# Patient Record
Sex: FEMALE | Race: OTHER | URBAN - METROPOLITAN AREA
[De-identification: names, ages, dates, MRNs, and addresses within clinical notes are randomized per-mention and may not be internally consistent; named-entity substitution may affect disease eponyms.]

---

## 2017-01-20 ENCOUNTER — EMERGENCY (EMERGENCY)
Facility: HOSPITAL | Age: 32
LOS: 0 days | Discharge: HOME | End: 2017-01-21
Admitting: EMERGENCY MEDICINE
Payer: MEDICAID
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
## 2017-01-20 DIAGNOSIS — T78.1XXA OTHER ADVERSE FOOD REACTIONS, NOT ELSEWHERE CLASSIFIED, INITIAL ENCOUNTER: ICD-10-CM

## 2017-01-20 DIAGNOSIS — R07.9 CHEST PAIN, UNSPECIFIED: ICD-10-CM

## 2017-01-20 DIAGNOSIS — R60.9 EDEMA, UNSPECIFIED: ICD-10-CM

## 2017-01-20 DIAGNOSIS — T78.3XXA ANGIONEUROTIC EDEMA, INITIAL ENCOUNTER: ICD-10-CM
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
## 2017-01-20 DIAGNOSIS — T78.40XA ALLERGY, UNSPECIFIED, INITIAL ENCOUNTER: ICD-10-CM

## 2017-01-20 PROCEDURE — 99284 EMERGENCY DEPT VISIT MOD MDM: CPT

## 2017-01-20 PROCEDURE — 93010 ELECTROCARDIOGRAM REPORT: CPT

## 2017-03-01 ENCOUNTER — OUTPATIENT (OUTPATIENT)
Dept: OUTPATIENT SERVICES | Facility: HOSPITAL | Age: 32
LOS: 1 days | End: 2017-03-01
Payer: MEDICAID

## 2017-03-13 DIAGNOSIS — R69 ILLNESS, UNSPECIFIED: ICD-10-CM

## 2017-09-01 PROCEDURE — G9001: CPT

## 2018-06-18 ENCOUNTER — HOSPITAL ENCOUNTER (EMERGENCY)
Facility: HOSPITAL | Age: 33
Discharge: HOME | End: 2018-06-18
Attending: EMERGENCY MEDICINE
Payer: MEDICAID

## 2018-06-18 VITALS
SYSTOLIC BLOOD PRESSURE: 110 MMHG | OXYGEN SATURATION: 100 % | HEIGHT: 61 IN | RESPIRATION RATE: 18 BRPM | DIASTOLIC BLOOD PRESSURE: 70 MMHG | BODY MASS INDEX: 32.1 KG/M2 | TEMPERATURE: 99 F | HEART RATE: 82 BPM | WEIGHT: 170 LBS

## 2018-06-18 DIAGNOSIS — R19.7 DIARRHEA, UNSPECIFIED TYPE: ICD-10-CM

## 2018-06-18 DIAGNOSIS — K52.9 GASTROENTERITIS: Primary | ICD-10-CM

## 2018-06-18 DIAGNOSIS — R11.0 NAUSEA: ICD-10-CM

## 2018-06-18 LAB
ALBUMIN SERPL BCP-MCNC: 3.8 G/DL
ALP SERPL-CCNC: 63 U/L
ALT SERPL W/O P-5'-P-CCNC: 26 U/L
ANION GAP SERPL CALC-SCNC: 8 MMOL/L
AST SERPL-CCNC: 22 U/L
B-HCG UR QL: NEGATIVE
BACTERIA #/AREA URNS HPF: ABNORMAL /HPF
BASOPHILS # BLD AUTO: 0.01 K/UL
BASOPHILS NFR BLD: 0.1 %
BILIRUB SERPL-MCNC: 0.4 MG/DL
BILIRUB UR QL STRIP: NEGATIVE
BUN SERPL-MCNC: 10 MG/DL
CALCIUM SERPL-MCNC: 9.2 MG/DL
CHLORIDE SERPL-SCNC: 108 MMOL/L
CLARITY UR: CLEAR
CO2 SERPL-SCNC: 22 MMOL/L
COLOR UR: YELLOW
CREAT SERPL-MCNC: 0.8 MG/DL
CTP QC/QA: YES
DIFFERENTIAL METHOD: ABNORMAL
EOSINOPHIL # BLD AUTO: 0.1 K/UL
EOSINOPHIL NFR BLD: 1.7 %
ERYTHROCYTE [DISTWIDTH] IN BLOOD BY AUTOMATED COUNT: 13.4 %
EST. GFR  (AFRICAN AMERICAN): >60 ML/MIN/1.73 M^2
EST. GFR  (NON AFRICAN AMERICAN): >60 ML/MIN/1.73 M^2
GLUCOSE SERPL-MCNC: 84 MG/DL
GLUCOSE UR QL STRIP: NEGATIVE
HCT VFR BLD AUTO: 35.3 %
HGB BLD-MCNC: 12.1 G/DL
HGB UR QL STRIP: ABNORMAL
KETONES UR QL STRIP: NEGATIVE
LEUKOCYTE ESTERASE UR QL STRIP: NEGATIVE
LIPASE SERPL-CCNC: 61 U/L
LYMPHOCYTES # BLD AUTO: 0.8 K/UL
LYMPHOCYTES NFR BLD: 11.3 %
MCH RBC QN AUTO: 28.3 PG
MCHC RBC AUTO-ENTMCNC: 34.3 G/DL
MCV RBC AUTO: 83 FL
MICROSCOPIC COMMENT: ABNORMAL
MONOCYTES # BLD AUTO: 0.4 K/UL
MONOCYTES NFR BLD: 5.6 %
NEUTROPHILS # BLD AUTO: 5.8 K/UL
NEUTROPHILS NFR BLD: 81.2 %
NITRITE UR QL STRIP: NEGATIVE
PH UR STRIP: 5 [PH] (ref 5–8)
PLATELET # BLD AUTO: 312 K/UL
PMV BLD AUTO: 10.4 FL
POTASSIUM SERPL-SCNC: 3.7 MMOL/L
PROT SERPL-MCNC: 7.9 G/DL
PROT UR QL STRIP: NEGATIVE
RBC # BLD AUTO: 4.27 M/UL
RBC #/AREA URNS HPF: >100 /HPF (ref 0–4)
SODIUM SERPL-SCNC: 138 MMOL/L
SP GR UR STRIP: 1.01 (ref 1–1.03)
SQUAMOUS #/AREA URNS HPF: 0 /HPF
URN SPEC COLLECT METH UR: ABNORMAL
UROBILINOGEN UR STRIP-ACNC: NEGATIVE EU/DL
WBC # BLD AUTO: 7.09 K/UL
WBC #/AREA URNS HPF: 0 /HPF (ref 0–5)

## 2018-06-18 PROCEDURE — 83690 ASSAY OF LIPASE: CPT

## 2018-06-18 PROCEDURE — 81025 URINE PREGNANCY TEST: CPT | Performed by: PHYSICIAN ASSISTANT

## 2018-06-18 PROCEDURE — 25000003 PHARM REV CODE 250: Performed by: NURSE PRACTITIONER

## 2018-06-18 PROCEDURE — 85025 COMPLETE CBC W/AUTO DIFF WBC: CPT

## 2018-06-18 PROCEDURE — 80053 COMPREHEN METABOLIC PANEL: CPT

## 2018-06-18 PROCEDURE — 63600175 PHARM REV CODE 636 W HCPCS: Performed by: NURSE PRACTITIONER

## 2018-06-18 PROCEDURE — 81000 URINALYSIS NONAUTO W/SCOPE: CPT

## 2018-06-18 PROCEDURE — 96361 HYDRATE IV INFUSION ADD-ON: CPT

## 2018-06-18 PROCEDURE — 99283 EMERGENCY DEPT VISIT LOW MDM: CPT | Mod: 25

## 2018-06-18 PROCEDURE — 96374 THER/PROPH/DIAG INJ IV PUSH: CPT

## 2018-06-18 RX ORDER — DICYCLOMINE HYDROCHLORIDE 10 MG/1
10 CAPSULE ORAL
Status: COMPLETED | OUTPATIENT
Start: 2018-06-18 | End: 2018-06-18

## 2018-06-18 RX ORDER — DICYCLOMINE HYDROCHLORIDE 20 MG/1
20 TABLET ORAL 2 TIMES DAILY PRN
Qty: 20 TABLET | Refills: 0 | Status: SHIPPED | OUTPATIENT
Start: 2018-06-18 | End: 2018-07-18

## 2018-06-18 RX ORDER — ONDANSETRON 4 MG/1
4 TABLET, FILM COATED ORAL EVERY 6 HOURS PRN
Qty: 12 TABLET | Refills: 0 | Status: SHIPPED | OUTPATIENT
Start: 2018-06-18

## 2018-06-18 RX ORDER — ONDANSETRON 2 MG/ML
4 INJECTION INTRAMUSCULAR; INTRAVENOUS
Status: COMPLETED | OUTPATIENT
Start: 2018-06-18 | End: 2018-06-18

## 2018-06-18 RX ADMIN — SODIUM CHLORIDE 1000 ML: 0.9 INJECTION, SOLUTION INTRAVENOUS at 04:06

## 2018-06-18 RX ADMIN — ONDANSETRON 4 MG: 2 INJECTION INTRAMUSCULAR; INTRAVENOUS at 04:06

## 2018-06-18 RX ADMIN — DICYCLOMINE HYDROCHLORIDE 10 MG: 10 CAPSULE ORAL at 04:06

## 2018-06-18 NOTE — DISCHARGE INSTRUCTIONS
Drink plenty of fluids to stay hydrated. Please follow up with her primary care doctor this week.      Please return to the Emergency Department for any new or worsening symptoms including: worsening abdominal pain, dark\black\bloody bowel movements, vomiting blood, hard abdomen, fever, chest pain, shortness of breath, loss of consciousness or any other concerns.     Please follow up with your Primary Care Provider within in the week. If you do not have a Primary Care Provider, you may contact the one listed on your discharge paperwork or you may also call the [facility] Appointment Desk at [phone] to schedule an appointment with a Primary Care Provider.

## 2018-06-18 NOTE — ED PROVIDER NOTES
Encounter Date: 6/18/2018  32 y.o. female with epigastric pain radiating to the lower abdomen with nausea and diarrhea.  Orders placed.  Patient will be seen by another provider for further evaluation when an exam room is available. Shivam ACEVEDO, 4:03 PM       History     Chief Complaint   Patient presents with    Abdominal Pain     States she has abd pain and diarrhea since yesterday.  Hx of gallbladder removal and ovarian cysts    Diarrhea     CC:  Abdominal pain, diarrhea    HPI:  This is evaluation of a 32-year-old female presenting with intermittent abdominal pain and diarrhea for 2 days.  Her abdominal pain is intermittent, beginning in the epigastric region and radiating down to her lower abdomen.  She reports 3 episodes of nonbloody diarrhea today.  She is nauseated but has not vomited.  She reports subjective fever and chills. She is currently on her menstrual cycle and reports painful menstruation.  No known sick contacts.  She has not attempted any treatment prior to arrival.    History of cholecystectomy in 2006 as well as left ovary removed in 2005 due to cyst.      The history is provided by the patient. No  was used.     Review of patient's allergies indicates:  Allergies not on file  Past Medical History:   Diagnosis Date    Ovarian cyst      Past Surgical History:   Procedure Laterality Date    CHOLECYSTECTOMY      OVARIAN CYST SURGERY       History reviewed. No pertinent family history.  Social History   Substance Use Topics    Smoking status: Never Smoker    Smokeless tobacco: Never Used    Alcohol use No     Review of Systems   Constitutional: Positive for chills and fever.   HENT: Negative for sore throat.    Respiratory: Negative for shortness of breath.    Cardiovascular: Negative for chest pain.   Gastrointestinal: Positive for abdominal pain, diarrhea and nausea. Negative for blood in stool and vomiting.   Genitourinary: Positive for vaginal bleeding (Currently  menstruating). Negative for difficulty urinating, dysuria and frequency.   Musculoskeletal: Negative for back pain.   Skin: Negative for rash.   Neurological: Negative for weakness.   Hematological: Does not bruise/bleed easily.       Physical Exam     Initial Vitals [06/18/18 1602]   BP Pulse Resp Temp SpO2   (!) 135/93 (!) 120 16 99.7 °F (37.6 °C) 98 %      MAP       --         Physical Exam    Constitutional: She appears well-developed and well-nourished. She is not diaphoretic. No distress.   HENT:   Head: Normocephalic and atraumatic.   Neck: Normal range of motion.   Cardiovascular: Normal rate, regular rhythm and normal heart sounds. Exam reveals no gallop and no friction rub.    No murmur heard.  Pulmonary/Chest: Breath sounds normal. No respiratory distress.   Abdominal: Soft. There is no hepatosplenomegaly. There is tenderness in the epigastric area. There is no rigidity, no rebound, no guarding, no CVA tenderness, no tenderness at McBurney's point and negative Harris's sign.   Musculoskeletal: Normal range of motion.   Neurological: She is alert and oriented to person, place, and time.   Skin: Skin is warm and dry.   Psychiatric: She has a normal mood and affect. Her behavior is normal.         ED Course   Procedures  Labs Reviewed   CBC W/ AUTO DIFFERENTIAL - Abnormal; Notable for the following:        Result Value    Hematocrit 35.3 (*)     Lymph # 0.8 (*)     Gran% 81.2 (*)     Lymph% 11.3 (*)     All other components within normal limits   COMPREHENSIVE METABOLIC PANEL - Abnormal; Notable for the following:     CO2 22 (*)     All other components within normal limits   LIPASE - Abnormal; Notable for the following:     Lipase 61 (*)     All other components within normal limits   URINALYSIS - Abnormal; Notable for the following:     Occult Blood UA 3+ (*)     All other components within normal limits   URINALYSIS MICROSCOPIC - Abnormal; Notable for the following:     RBC, UA >100 (*)     All other  components within normal limits   POCT URINE PREGNANCY          No orders to display        Medical Decision Making:   ED Management:  This is an evaluation of a 32 y.o. female that presents to the Emergency Department for Abdominal Pain. Physical Exam shows a non-toxic, afebrile, and well appearing female. On examination the abdomen is flat without distention.  There is no surface trauma, scars, incisions.  Bowel sounds are present in all 4 quadrants.  No guarding or rigidity to palpation.  There is mild tenderness with palpation of the epigastric region. No masses or pulsation in epigastric area.  No organomegaly.  Negative Harris sign. No rebound in the lower quadrants.  Nontender over McBurney's point.  No suprapubic tenderness or distention.  Good femoral pulses bilaterally.  No CVA tenderness.    Vital Signs Are Reassuring.  RESULTS:   UPT negative.  Urinalysis with 3+ occult blood.  Microscopic UA with greater than 100 red blood cells.  Patient is currently menstruating.  The chemistry was negative for hypo-or hyper natremia, kalemia, chloridemia, or other electrolyte abnormalities; BUN and creatinine were within normal limits indicating normal kidney function, ALT and AST were within normal limits indicating normal liver function, there was no transaminitis.  Lipase within normal limits indicating normal pancreatic function.  CBC was negativefor leukocytosis  indicating unlikely systemic infection, the H&H was stable and was within normal limits and indicating absence of anemia. The platelet count was normal indicating the absence of a tendency toward bleeding.    Patient was given IV fluids, Zofran, and Bentyl with good relief in symptoms. No episodes of emesis in the ED.  She reports improvement in abdominal pain. Tachycardia improved after IV fluid administration.  Abdomen is soft.  No peritoneal signs to suggest acute intra-abdominal process.  Patient had a cholecystectomy in 2006 ruling out gallbladder  involvement.     My overall impression is gastroenteritis. I considered, but at this time, do not suspect an acute intra-abdominal process appendicitis, mesenteric ischemia, obstruction, cholecystitis, pancreatitis, diverticulitis, colitis, volvulus, hernia, UTI.    ED Course: IVFs, zofran, bentyl. D/C Meds: zofran, bentyl. Additional D/C Information: Abdominal Pain Precautions. The diagnosis, treatment plan, instructions for follow-up and reevaluation with her PCP as well as ED return precautions were discussed and understanding was verbalized. All questions or concerns have been addressed.     This case was discussed with Dr. Velasquez who is in agreement with my assessment and plan.                       Clinical Impression:   The primary encounter diagnosis was Gastroenteritis. Diagnoses of Nausea and Diarrhea, unspecified type were also pertinent to this visit.      Disposition:   Disposition: Discharged  Condition: Stable                        Michell Triana NP  06/18/18 3582

## 2018-06-18 NOTE — ED TRIAGE NOTES
Diarrhea since yesterday and epigastric and lower abdominal pain sharp intermittent pain nauseated

## 2018-12-27 ENCOUNTER — HOSPITAL ENCOUNTER (EMERGENCY)
Facility: HOSPITAL | Age: 33
Discharge: HOME | End: 2018-12-27
Attending: EMERGENCY MEDICINE
Payer: MEDICAID

## 2018-12-27 VITALS
TEMPERATURE: 98 F | WEIGHT: 160 LBS | HEIGHT: 61 IN | OXYGEN SATURATION: 95 % | BODY MASS INDEX: 30.21 KG/M2 | HEART RATE: 90 BPM | SYSTOLIC BLOOD PRESSURE: 125 MMHG | RESPIRATION RATE: 18 BRPM | DIASTOLIC BLOOD PRESSURE: 81 MMHG

## 2018-12-27 DIAGNOSIS — M79.609 POPLITEAL PAIN: ICD-10-CM

## 2018-12-27 DIAGNOSIS — M54.2 NECK PAIN: Primary | ICD-10-CM

## 2018-12-27 LAB
B-HCG UR QL: NEGATIVE
CTP QC/QA: YES
DEPRECATED S PYO AG THROAT QL EIA: NEGATIVE

## 2018-12-27 PROCEDURE — 87081 CULTURE SCREEN ONLY: CPT

## 2018-12-27 PROCEDURE — 96372 THER/PROPH/DIAG INJ SC/IM: CPT

## 2018-12-27 PROCEDURE — 81025 URINE PREGNANCY TEST: CPT | Performed by: PHYSICIAN ASSISTANT

## 2018-12-27 PROCEDURE — 87880 STREP A ASSAY W/OPTIC: CPT

## 2018-12-27 PROCEDURE — 25000003 PHARM REV CODE 250: Performed by: PHYSICIAN ASSISTANT

## 2018-12-27 PROCEDURE — 99284 EMERGENCY DEPT VISIT MOD MDM: CPT

## 2018-12-27 RX ORDER — IBUPROFEN 600 MG/1
600 TABLET ORAL EVERY 6 HOURS PRN
Qty: 20 TABLET | Refills: 0 | Status: SHIPPED | OUTPATIENT
Start: 2018-12-27 | End: 2019-01-01

## 2018-12-27 RX ORDER — ACETAMINOPHEN 325 MG/1
650 TABLET ORAL
Status: COMPLETED | OUTPATIENT
Start: 2018-12-27 | End: 2018-12-27

## 2018-12-27 RX ORDER — KETOROLAC TROMETHAMINE 30 MG/ML
15 INJECTION, SOLUTION INTRAMUSCULAR; INTRAVENOUS
Status: DISCONTINUED | OUTPATIENT
Start: 2018-12-27 | End: 2018-12-27 | Stop reason: HOSPADM

## 2018-12-27 RX ADMIN — ACETAMINOPHEN 650 MG: 325 TABLET ORAL at 01:12

## 2018-12-27 NOTE — ED PROVIDER NOTES
Encounter Date: 12/27/2018    SORT:  33 y.o. female presenting to ED with multiple complaints. Limited triage exam:  Non-toxic. If orders were placed, they are pending.     Ricky Levin PA-C  12/27/2018  1:27 PM   SCRIBE #1 NOTE: ISerena, mohsen scribing for, and in the presence of,  Jacinto Allan PA-C. I have scribed the following portions of the note - Other sections scribed: HPI and ROS.       History     Chief Complaint   Patient presents with    Neck Pain     States she has pain in her neck by her glands, but states it doesn't feel like sore throat.  Also states she has lower right leg pain behind her calf muscle    Leg Pain     CC: Neck Pain; Leg Pain    HPI: This 33 y.o female presents to the ED for an evaluation of acute, constant, 8/10 atraumatic left sided neck pain for the past 2 days. Patient also reports of right calf pain.  Patient reports her pain is worse with movement.  Patient denies fever, chills, nausea, emesis, diarrhea, abdominal pain, leg swelling, back pain, extremity numbness, extremity weakness, sore throat, trouble swallowing, or any other associated symptoms.  Patient denies any prior h/o DVT/PE.  Patient denies any recent extended travels, periods of immobilizations, or hormone replacement therapy.  No prior tx.  No alleviating factors.        The history is provided by the patient. No  was used.     Review of patient's allergies indicates:   Allergen Reactions    Penicillins Rash     Past Medical History:   Diagnosis Date    Ovarian cyst      Past Surgical History:   Procedure Laterality Date    CHOLECYSTECTOMY      OVARIAN CYST SURGERY       History reviewed. No pertinent family history.  Social History     Tobacco Use    Smoking status: Never Smoker    Smokeless tobacco: Never Used   Substance Use Topics    Alcohol use: No    Drug use: No     Review of Systems   Constitutional: Negative for chills and fever.   HENT: Negative for ear pain and sore  throat.    Eyes: Negative for pain.   Respiratory: Negative for cough and shortness of breath.    Cardiovascular: Negative for chest pain.   Gastrointestinal: Negative for abdominal pain, diarrhea, nausea and vomiting.   Genitourinary: Negative for dysuria.   Musculoskeletal: Positive for arthralgias and neck pain. Negative for back pain.   Skin: Negative for rash.   Neurological: Negative for weakness, numbness and headaches.       Physical Exam     Initial Vitals [12/27/18 1326]   BP Pulse Resp Temp SpO2   120/74 92 16 99.1 °F (37.3 °C) 97 %      MAP       --         Physical Exam    Nursing note and vitals reviewed.  Constitutional: She appears well-developed and well-nourished. No distress.   HENT:   Head: Normocephalic and atraumatic.   Mouth/Throat: Uvula is midline.   2+ tonsils bilaterally without exudates   Eyes: EOM are normal. Pupils are equal, round, and reactive to light.   Neck: Trachea normal, normal range of motion, full passive range of motion without pain and phonation normal. Neck supple. Thyromegaly ( mild) present. Muscular tenderness (There is tenderness to palpation over the left sternocleidomastoid muscle) present. No tracheal tenderness and no spinous process tenderness present. No tracheal deviation and normal range of motion present. No neck rigidity.   Cardiovascular: Normal rate, regular rhythm and normal heart sounds. Exam reveals no gallop and no friction rub.    No murmur heard.  Pulmonary/Chest: Breath sounds normal. No respiratory distress. She has no wheezes. She has no rhonchi. She has no rales.   Abdominal: Soft. Bowel sounds are normal. There is no tenderness. There is no rebound and no guarding.   Musculoskeletal: Normal range of motion.        Right knee: Normal.        Legs:  No calf swelling   Lymphadenopathy:     She has no cervical adenopathy.   Neurological: She is alert and oriented to person, place, and time.   Skin: Skin is warm and dry.   Psychiatric: She has a normal  mood and affect.         ED Course   Procedures  Labs Reviewed   THROAT SCREEN, RAPID   CULTURE, STREP A,  THROAT   POCT URINE PREGNANCY          Imaging Results          US Lower Extremity Veins Right (Final result)  Result time 12/27/18 14:33:09    Final result by Geovanny Avina MD (12/27/18 14:33:09)                 Impression:      No evidence of deep venous thrombosis in the right lower extremity.      Electronically signed by: Geovanny Avina MD  Date:    12/27/2018  Time:    14:33             Narrative:    EXAMINATION:  US LOWER EXTREMITY VEINS RIGHT    CLINICAL HISTORY:  Pain in unspecified limb    TECHNIQUE:  Duplex and color flow Doppler evaluation and graded compression of the right lower extremity veins was performed.    COMPARISON:  None    FINDINGS:  Duplex and color flow Doppler evaluation does not reveal any evidence of acute venous thrombosis in the common femoral, superficial femoral, greater saphenous, popliteal, peroneal, anterior tibial and posterior tibial veins of the right lower extremity.  There is no reflux to suggest valvular incompetence.                                 Medical Decision Making:   Clinical Tests:   Lab Tests: Ordered and Reviewed  Radiological Study: Ordered and Reviewed  ED Management:  This is an evaluation of a 33 y.o. female who presents to the ED for left-sided neck pain and right calf pain.  Vital signs are stable.   Afebrile.  Patient is nontoxic appearing and in no acute distress. There is tenderness to palpation over the left sternocleidomastoid muscle.  Patient has 2+ tonsils bilaterally without exudates. No cervical lymphadenopathy.  Airway is patent and uvula is midline. I do not suspect infectious etiology.  There is tenderness to palpation to the right proximal lateral aspect of the right calf.  No erythema swelling. UPT negative.  Rapid strep negative. Ultrasound lower extremity shows no signs of DVT.  Etiology of patient's neck pain likely muscle  strain.  Etiology of patient's calf pain likely muscle strain versus ligamentous strain versus tendinitis.  Patient will be discharged home with anti-inflammatories.  Patient urged to follow up with primary care for enlarged tonsils and mild thyromegaly.    Patient given return precautions and instructed to return to the emergency department for any new or worsening symptoms. Patient verbalized understanding and agreed with plan.     I discussed the case with Dr. Jacob who is in agreement with my assessment and plan.              Scribe Attestation:   Scribe #1: I performed the above scribed service and the documentation accurately describes the services I performed. I attest to the accuracy of the note.    Attending Attestation:     Physician Attestation Statement for NP/PA:   I discussed this assessment and plan of this patient with the NP/PA, but I did not personally examine the patient. The face to face encounter was performed by the NP/PA.        Physician Attestation for Scribe:  Physician Attestation Statement for Scribe #1: I, Jacinto Allan PA-C, reviewed documentation, as scribed by Serena Aguilar in my presence, and it is both accurate and complete.                    Clinical Impression:   The primary encounter diagnosis was Neck pain. A diagnosis of Popliteal pain was also pertinent to this visit.                             Jacinto Allan PA-C  12/27/18 2102       Jarad Jacob MD  12/27/18 2120

## 2018-12-27 NOTE — ED TRIAGE NOTES
"" I don't know what it is but it hurts in one spot and on the left of my neck it hurts, it's was really sore yesterday" c/o pain back of R knee since yesterday, rates 8/10, c/o L sided tonsil pain 8/10  "

## 2018-12-29 LAB — BACTERIA THROAT CULT: NORMAL

## 2020-02-03 ENCOUNTER — EMERGENCY (EMERGENCY)
Facility: HOSPITAL | Age: 35
LOS: 0 days | Discharge: HOME | End: 2020-02-03
Attending: EMERGENCY MEDICINE
Payer: MEDICAID

## 2020-02-03 VITALS — WEIGHT: 182.98 LBS | HEIGHT: 61 IN

## 2020-02-03 VITALS
DIASTOLIC BLOOD PRESSURE: 86 MMHG | OXYGEN SATURATION: 100 % | RESPIRATION RATE: 16 BRPM | HEART RATE: 102 BPM | TEMPERATURE: 100 F | SYSTOLIC BLOOD PRESSURE: 129 MMHG

## 2020-02-03 DIAGNOSIS — J06.9 ACUTE UPPER RESPIRATORY INFECTION, UNSPECIFIED: ICD-10-CM

## 2020-02-03 DIAGNOSIS — R09.81 NASAL CONGESTION: ICD-10-CM

## 2020-02-03 DIAGNOSIS — Z88.0 ALLERGY STATUS TO PENICILLIN: ICD-10-CM

## 2020-02-03 DIAGNOSIS — R51 HEADACHE: ICD-10-CM

## 2020-02-03 PROCEDURE — 99283 EMERGENCY DEPT VISIT LOW MDM: CPT

## 2020-02-03 RX ORDER — ACETAMINOPHEN 500 MG
650 TABLET ORAL ONCE
Refills: 0 | Status: COMPLETED | OUTPATIENT
Start: 2020-02-03 | End: 2020-02-03

## 2020-02-03 RX ADMIN — Medication 650 MILLIGRAM(S): at 16:25

## 2020-02-03 NOTE — ED STATDOCS - PATIENT PORTAL LINK FT
You can access the FollowMyHealth Patient Portal offered by [facility] by registering at the following website: [email or web address]. By joining [facility]’s FollowMyHealth portal, you will also be able to view your health information using other applications (apps) compatible with our system.

## 2020-02-03 NOTE — ED STATDOCS - ENMT, MLM
Nasal mucosa clear.  Mouth with normal mucosa  Throat has no vesicles, no oropharyngeal exudates and uvula is midline. +L sided sinus TTP.

## 2020-02-03 NOTE — ED STATDOCS - OBJECTIVE STATEMENT
35 y/o F with no PMHx presents to the ED c/o flu like symptoms including a HA, chest heaviness, sinus congestion and body aches for the past few days. States she was unable to get an appt with her MD prompting ED visit. NKDA. No tobacco use, no EtOH use. Pt has not taken OTC medications.

## 2020-02-03 NOTE — ED STATDOCS - NSFOLLOWUPINSTRUCTIONS_ED_ALL_ED_FT
Upper Respiratory Infection, Adult  An upper respiratory infection (URI) affects the nose, throat, and upper air passages. URIs are caused by germs (viruses). The most common type of URI is often called "the common cold."    Medicines cannot cure URIs, but you can do things at home to relieve your symptoms. URIs usually get better within 7–10 days.    Follow these instructions at home:  Activity     Rest as needed.  If you have a fever, stay home from work or school until your fever is gone, or until your doctor says you may return to work or school.    You should stay home until you cannot spread the infection anymore (you are not contagious).  Your doctor may have you wear a face mask so you have less risk of spreading the infection.    Relieving symptoms     Gargle with a salt-water mixture 3–4 times a day or as needed. To make a salt-water mixture, completely dissolve ½–1 tsp of salt in 1 cup of warm water.  Use a cool-mist humidifier to add moisture to the air. This can help you breathe more easily.  Eating and drinking     Drink enough fluid to keep your pee (urine) pale yellow.  ImageEat soups and other clear broths.  General instructions     Take over-the-counter and prescription medicines only as told by your doctor. These include cold medicines, fever reducers, and cough suppressants.  Do not use any products that contain nicotine or tobacco. These include cigarettes and e-cigarettes. If you need help quitting, ask your doctor.  Avoid being where people are smoking (avoid secondhand smoke).  Make sure you get regular shots and get the flu shot every year.  ImageKeep all follow-up visits as told by your doctor. This is important.  How to avoid spreading infection to others     Wash your hands often with soap and water. If you do not have soap and water, use hand .  Avoid touching your mouth, face, eyes, or nose.  ImageCough or sneeze into a tissue or your sleeve or elbow. Do not cough or sneeze into your hand or into the air.  Contact a doctor if:  You are getting worse, not better.  You have any of these:    A fever.  Chills.  Brown or red mucus in your nose.  Yellow or brown fluid (discharge)coming from your nose.  Pain in your face, especially when you bend forward.  Swollen neck glands.  Pain with swallowing.  White areas in the back of your throat.    Get help right away if:  You have shortness of breath that gets worse.  You have very bad or constant:    Headache.  Ear pain.  Pain in your forehead, behind your eyes, and over your cheekbones (sinus pain).  Chest pain.    You have long-lasting (chronic) lung disease along with any of these:    Wheezing.  Long-lasting cough.  Coughing up blood.  A change in your usual mucus.    You have a stiff neck.  You have changes in your:    Vision.  Hearing.  Thinking.  Mood.    Summary  An upper respiratory infection (URI) is caused by a germ called a virus. The most common type of URI is often called "the common cold."  URIs usually get better within 7–10 days.  Take over-the-counter and prescription medicines only as told by your doctor.  This information is not intended to replace advice given to you by your health care provider. Make sure you discuss any questions you have with your health care provider.     FOLLOW UP WITH AN ENT (EAR NOSE AND THROAT DOCTOR) IN 3-5 DAYS IF STILL FEELING VERY CONGESTED. TAKE OVER THE COUNTER FLONASE AND SUDAFED AS DIRECTED OF THE BOX FOR YOUR SYMPTOMS. RETURN TO ER FOR ANY WORSENING SYMPTOMS OR NEW CONCERNS.

## 2020-02-03 NOTE — ED STATDOCS - PROGRESS NOTE DETAILS
signed Ivelisse Hooper PA-C Pt seen initially in intake by Dr. Angela   34F c/o congestion, body, aches, chest heaviness, leg and arm pain, and left sided HA x 2 days. Pt alert, NAD, sounds slightly congested. Likely viral uri, recommend OTC flonase and sudafed, f/u ENT if congestion persists. Pt feeling well at DC, agrees with DC and plan of care. SAHIL Perez.

## 2020-02-03 NOTE — ED STATDOCS - INTERNATIONAL TRAVEL
[FreeTextEntry1] : 65y/o F with extensive cancer hx presenting for follow-up. \par \par #Stage IV endometrial cancer with reoccurance of disease\par - known brain mets s/p RT\par - currently on carbo/taxol\par - Heme/onc f/u \par \par #Anemia, macrocytic likely multifactorial \par - Hbg 8.8 > 9.6\par - folate 12\par - likely due to chemo and chronic disease and possible underlying bone involvement\par - f/u with heme/onc\par \par #DM\par - last A1c 7.2\par - c/w metformin 500 qhs\par - recheck A1c next time she gets blood done\par - Optho UTD\par \par #Hx of thyroid cancer / Hypothyroidism: \par - please follow up with Dr. Acevedo (referral given today)\par \par #L Cataract\par - pending for surgery\par \par #HCM\par - Mammogram - still pending\par - PAP - Feb 2019 at [place] (being followed with them), s/p hysterectomy \par - colo UTD
No

## 2020-02-06 ENCOUNTER — EMERGENCY (EMERGENCY)
Facility: HOSPITAL | Age: 35
LOS: 0 days | Discharge: HOME | End: 2020-02-06
Attending: EMERGENCY MEDICINE
Payer: MEDICAID

## 2020-02-06 VITALS
HEART RATE: 100 BPM | TEMPERATURE: 99 F | OXYGEN SATURATION: 96 % | SYSTOLIC BLOOD PRESSURE: 137 MMHG | DIASTOLIC BLOOD PRESSURE: 85 MMHG | RESPIRATION RATE: 17 BRPM

## 2020-02-06 VITALS — WEIGHT: 179.9 LBS | HEIGHT: 61 IN

## 2020-02-06 DIAGNOSIS — J10.1 INFLUENZA DUE TO OTHER IDENTIFIED INFLUENZA VIRUS WITH OTHER RESPIRATORY MANIFESTATIONS: ICD-10-CM

## 2020-02-06 DIAGNOSIS — R09.89 OTHER SPECIFIED SYMPTOMS AND SIGNS INVOLVING THE CIRCULATORY AND RESPIRATORY SYSTEMS: ICD-10-CM

## 2020-02-06 DIAGNOSIS — R07.0 PAIN IN THROAT: ICD-10-CM

## 2020-02-06 DIAGNOSIS — Z88.0 ALLERGY STATUS TO PENICILLIN: ICD-10-CM

## 2020-02-06 DIAGNOSIS — R05 COUGH: ICD-10-CM

## 2020-02-06 PROBLEM — Z78.9 OTHER SPECIFIED HEALTH STATUS: Chronic | Status: ACTIVE | Noted: 2020-02-04

## 2020-02-06 LAB
FLU A RESULT: SIGNIFICANT CHANGE UP
FLU A RESULT: SIGNIFICANT CHANGE UP
FLUAV AG NPH QL: SIGNIFICANT CHANGE UP
FLUBV AG NPH QL: DETECTED
RSV RESULT: SIGNIFICANT CHANGE UP
RSV RNA RESP QL NAA+PROBE: SIGNIFICANT CHANGE UP
S PYO AG SPEC QL IA: NEGATIVE — SIGNIFICANT CHANGE UP

## 2020-02-06 PROCEDURE — 99283 EMERGENCY DEPT VISIT LOW MDM: CPT | Mod: 25

## 2020-02-06 PROCEDURE — 71046 X-RAY EXAM CHEST 2 VIEWS: CPT

## 2020-02-06 PROCEDURE — 87631 RESP VIRUS 3-5 TARGETS: CPT

## 2020-02-06 PROCEDURE — 87081 CULTURE SCREEN ONLY: CPT

## 2020-02-06 PROCEDURE — 87880 STREP A ASSAY W/OPTIC: CPT

## 2020-02-06 PROCEDURE — 71046 X-RAY EXAM CHEST 2 VIEWS: CPT | Mod: 26

## 2020-02-06 PROCEDURE — 99284 EMERGENCY DEPT VISIT MOD MDM: CPT

## 2020-02-06 RX ORDER — DEXAMETHASONE 0.5 MG/5ML
10 ELIXIR ORAL ONCE
Refills: 0 | Status: COMPLETED | OUTPATIENT
Start: 2020-02-06 | End: 2020-02-06

## 2020-02-06 RX ADMIN — Medication 10 MILLIGRAM(S): at 11:28

## 2020-02-06 RX ADMIN — Medication 75 MILLIGRAM(S): at 12:35

## 2020-02-06 NOTE — ED STATDOCS - ENMT, MLM
Nasal mucosa clear.  Mouth with normal mucosa  Throat has no vesicles, no oropharyngeal exudates and uvula is midline. Redness of oropharynx. Enlarged tonsils not touching. Mild cervical lymphadenopathy. TM normal b/l.

## 2020-02-06 NOTE — ED STATDOCS - PROGRESS NOTE DETAILS
35 yo unemployed female with no significant PMH presents with worsening flu like symptoms. Pt was seen on 2/3 and dx with a viral URI and advised to take flonase and sudafed. Pt states the congestion helped a little but has a sore throat, prod cough of yellow sputum. Denies fever, sick contacts, cp, sob. Strep test, flu test, cxr, meds, Reeval. -Alonso Godinez PA-C +flu B. Pt was made aware. Pt in the window for treatment. Will treat with tamiflu and advised for pmd fu/ Pt aware and agrees with plan. -Alonso Godienz PA-C

## 2020-02-06 NOTE — ED STATDOCS - OBJECTIVE STATEMENT
33 y/o female with no significant PMHx presents to the ED for evaluation.  Pt reports she has had congestion and a productive cough x3 days. Pt was seen in ED 3 days ago for symptoms and notes symptoms have worsened. Pt states she took Sudafed without improvement. +cough, +congestion, +throat pain. Did not get flu vaccine this year. Nonsmoker. No recent travel. No other complaints at this time.

## 2020-02-06 NOTE — ED ADULT TRIAGE NOTE - CHIEF COMPLAINT QUOTE
pt presents to ED was seen by ED 2-3days ago now with complaints of swollen tonsils has been taking sudafed x 3 days with no relief fever and chills

## 2020-02-06 NOTE — ED ADULT NURSE NOTE - OBJECTIVE STATEMENT
Pt came to the ED for eval of sore throat and tonsil pain for the past week. States she was in the ED 2 days ago for similar sx, was sent home with medications but states symptoms still persist.

## 2020-02-06 NOTE — ED STATDOCS - NSFOLLOWUPINSTRUCTIONS_ED_ALL_ED_FT
Viral Respiratory Infection  A viral respiratory infection is an illness that affects parts of the body used for breathing, like the lungs, nose, and throat. It is caused by a germ called a virus.    ImageSome examples of this kind of infection are:    A cold.  The flu (influenza).  A respiratory syncytial virus (RSV) infection.    How do I know if I have this infection?  Most of the time this infection causes:    A stuffy or runny nose.  Yellow or green fluid in the nose.  A cough.  Sneezing.  Tiredness (fatigue).  Achy muscles.  A sore throat.  Sweating or chills.  A fever.  A headache.    How is this infection treated?  If the flu is diagnosed early, it may be treated with an antiviral medicine. This medicine shortens the length of time a person has symptoms. Symptoms may be treated with over-the-counter and prescription medicines, such as:    Expectorants. These make it easier to cough up mucus.  Decongestant nasal sprays.    Doctors do not prescribe antibiotic medicines for viral infections. They do not work with this kind of infection.    How do I know if I should stay home?  To keep others from getting sick, stay home if you have:    A fever.  A lasting cough.  A sore throat.  A runny nose.  Sneezing.  Muscles aches.  Headaches.  Tiredness.  Weakness.  Chills.  Sweating.  An upset stomach (nausea).    Follow these instructions at home:  Rest as much as possible.  Take over-the-counter and prescription medicines only as told by your doctor.  Drink enough fluid to keep your pee (urine) clear or pale yellow.  Gargle with salt water. Do this 3–4 times per day or as needed. To make a salt–water mixture, dissolve ½–1 tsp of salt in 1 cup of warm water. Make sure the salt dissolves all the way.  Use nose drops made from salt water. This helps with stuffiness (congestion). It also helps soften the skin around your nose.  Do not drink alcohol.  Do not use tobacco products, including cigarettes, chewing tobacco, and e-cigarettes. If you need help quitting, ask your doctor.  Get help if:  Your symptoms last for 10 days or longer.  Your symptoms get worse over time.  You have a fever.  You have very bad pain in your face or forehead.  Parts of your jaw or neck become very swollen.  Get help right away if:  You feel pain or pressure in your chest.  You have shortness of breath.  You faint or feel like you will faint.  You keep throwing up (vomiting).  You feel confused.  This information is not intended to replace advice given to you by your health care provider. Make sure you discuss any questions you have with your health care provider.

## 2020-02-06 NOTE — ED STATDOCS - PATIENT PORTAL LINK FT
You can access the FollowMyHealth Patient Portal offered by [facility] by registering at the following website: [email or web address]. By joining [employer]’s FollowMyHealth portal, you will also be able to view your health information using other applications (apps) compatible with our system.

## 2020-07-10 ENCOUNTER — EMERGENCY (EMERGENCY)
Facility: HOSPITAL | Age: 35
LOS: 0 days | Discharge: HOME | End: 2020-07-10
Attending: STUDENT IN AN ORGANIZED HEALTH CARE EDUCATION/TRAINING PROGRAM
Payer: MEDICAID

## 2020-07-10 VITALS
SYSTOLIC BLOOD PRESSURE: 132 MMHG | OXYGEN SATURATION: 100 % | HEIGHT: 61 IN | DIASTOLIC BLOOD PRESSURE: 91 MMHG | TEMPERATURE: 99 F | HEART RATE: 93 BPM | WEIGHT: 179.9 LBS

## 2020-07-10 VITALS
TEMPERATURE: 99 F | DIASTOLIC BLOOD PRESSURE: 88 MMHG | OXYGEN SATURATION: 100 % | HEART RATE: 90 BPM | SYSTOLIC BLOOD PRESSURE: 130 MMHG

## 2020-07-10 DIAGNOSIS — R51 HEADACHE: ICD-10-CM

## 2020-07-10 DIAGNOSIS — R10.31 RIGHT LOWER QUADRANT PAIN: ICD-10-CM

## 2020-07-10 DIAGNOSIS — R11.0 NAUSEA: ICD-10-CM

## 2020-07-10 DIAGNOSIS — Z88.0 ALLERGY STATUS TO PENICILLIN: ICD-10-CM

## 2020-07-10 DIAGNOSIS — Z91.018 ALLERGY TO OTHER FOODS: ICD-10-CM

## 2020-07-10 LAB
ALBUMIN SERPL ELPH-MCNC: 3.8 G/DL — SIGNIFICANT CHANGE UP (ref 3.3–5)
ALP SERPL-CCNC: 64 U/L — SIGNIFICANT CHANGE UP (ref 40–120)
ALT FLD-CCNC: 33 U/L — SIGNIFICANT CHANGE UP (ref 12–78)
ANION GAP SERPL CALC-SCNC: 6 MMOL/L — SIGNIFICANT CHANGE UP (ref 5–17)
APPEARANCE UR: CLEAR — SIGNIFICANT CHANGE UP
AST SERPL-CCNC: 17 U/L — SIGNIFICANT CHANGE UP (ref 15–37)
BASOPHILS # BLD AUTO: 0.03 K/UL — SIGNIFICANT CHANGE UP (ref 0–0.2)
BASOPHILS NFR BLD AUTO: 0.3 % — SIGNIFICANT CHANGE UP (ref 0–2)
BILIRUB SERPL-MCNC: 0.3 MG/DL — SIGNIFICANT CHANGE UP (ref 0.2–1.2)
BILIRUB UR-MCNC: NEGATIVE — SIGNIFICANT CHANGE UP
BUN SERPL-MCNC: 10 MG/DL — SIGNIFICANT CHANGE UP (ref 7–23)
CALCIUM SERPL-MCNC: 9.2 MG/DL — SIGNIFICANT CHANGE UP (ref 8.5–10.1)
CHLORIDE SERPL-SCNC: 107 MMOL/L — SIGNIFICANT CHANGE UP (ref 96–108)
CO2 SERPL-SCNC: 24 MMOL/L — SIGNIFICANT CHANGE UP (ref 22–31)
COLOR SPEC: YELLOW — SIGNIFICANT CHANGE UP
CREAT SERPL-MCNC: 0.67 MG/DL — SIGNIFICANT CHANGE UP (ref 0.5–1.3)
DIFF PNL FLD: ABNORMAL
EOSINOPHIL # BLD AUTO: 0.23 K/UL — SIGNIFICANT CHANGE UP (ref 0–0.5)
EOSINOPHIL NFR BLD AUTO: 2.2 % — SIGNIFICANT CHANGE UP (ref 0–6)
GLUCOSE SERPL-MCNC: 100 MG/DL — HIGH (ref 70–99)
GLUCOSE UR QL: NEGATIVE MG/DL — SIGNIFICANT CHANGE UP
HCT VFR BLD CALC: 35.8 % — SIGNIFICANT CHANGE UP (ref 34.5–45)
HGB BLD-MCNC: 11.9 G/DL — SIGNIFICANT CHANGE UP (ref 11.5–15.5)
IMM GRANULOCYTES NFR BLD AUTO: 0.4 % — SIGNIFICANT CHANGE UP (ref 0–1.5)
KETONES UR-MCNC: ABNORMAL
LEUKOCYTE ESTERASE UR-ACNC: ABNORMAL
LYMPHOCYTES # BLD AUTO: 1.45 K/UL — SIGNIFICANT CHANGE UP (ref 1–3.3)
LYMPHOCYTES # BLD AUTO: 13.7 % — SIGNIFICANT CHANGE UP (ref 13–44)
MCHC RBC-ENTMCNC: 28.9 PG — SIGNIFICANT CHANGE UP (ref 27–34)
MCHC RBC-ENTMCNC: 33.2 GM/DL — SIGNIFICANT CHANGE UP (ref 32–36)
MCV RBC AUTO: 86.9 FL — SIGNIFICANT CHANGE UP (ref 80–100)
MONOCYTES # BLD AUTO: 0.52 K/UL — SIGNIFICANT CHANGE UP (ref 0–0.9)
MONOCYTES NFR BLD AUTO: 4.9 % — SIGNIFICANT CHANGE UP (ref 2–14)
NEUTROPHILS # BLD AUTO: 8.3 K/UL — HIGH (ref 1.8–7.4)
NEUTROPHILS NFR BLD AUTO: 78.5 % — HIGH (ref 43–77)
NITRITE UR-MCNC: NEGATIVE — SIGNIFICANT CHANGE UP
PH UR: 5 — SIGNIFICANT CHANGE UP (ref 5–8)
PLATELET # BLD AUTO: 291 K/UL — SIGNIFICANT CHANGE UP (ref 150–400)
POTASSIUM SERPL-MCNC: 4 MMOL/L — SIGNIFICANT CHANGE UP (ref 3.5–5.3)
POTASSIUM SERPL-SCNC: 4 MMOL/L — SIGNIFICANT CHANGE UP (ref 3.5–5.3)
PROT SERPL-MCNC: 8.1 GM/DL — SIGNIFICANT CHANGE UP (ref 6–8.3)
PROT UR-MCNC: NEGATIVE MG/DL — SIGNIFICANT CHANGE UP
RBC # BLD: 4.12 M/UL — SIGNIFICANT CHANGE UP (ref 3.8–5.2)
RBC # FLD: 13.2 % — SIGNIFICANT CHANGE UP (ref 10.3–14.5)
SODIUM SERPL-SCNC: 137 MMOL/L — SIGNIFICANT CHANGE UP (ref 135–145)
SP GR SPEC: 1.02 — SIGNIFICANT CHANGE UP (ref 1.01–1.02)
UROBILINOGEN FLD QL: NEGATIVE MG/DL — SIGNIFICANT CHANGE UP
WBC # BLD: 10.57 K/UL — HIGH (ref 3.8–10.5)
WBC # FLD AUTO: 10.57 K/UL — HIGH (ref 3.8–10.5)

## 2020-07-10 PROCEDURE — 96375 TX/PRO/DX INJ NEW DRUG ADDON: CPT

## 2020-07-10 PROCEDURE — 96361 HYDRATE IV INFUSION ADD-ON: CPT

## 2020-07-10 PROCEDURE — 99285 EMERGENCY DEPT VISIT HI MDM: CPT

## 2020-07-10 PROCEDURE — 87086 URINE CULTURE/COLONY COUNT: CPT

## 2020-07-10 PROCEDURE — 85025 COMPLETE CBC W/AUTO DIFF WBC: CPT

## 2020-07-10 PROCEDURE — 96374 THER/PROPH/DIAG INJ IV PUSH: CPT

## 2020-07-10 PROCEDURE — 81001 URINALYSIS AUTO W/SCOPE: CPT

## 2020-07-10 PROCEDURE — 36415 COLL VENOUS BLD VENIPUNCTURE: CPT

## 2020-07-10 PROCEDURE — 80053 COMPREHEN METABOLIC PANEL: CPT

## 2020-07-10 PROCEDURE — 99284 EMERGENCY DEPT VISIT MOD MDM: CPT | Mod: 25

## 2020-07-10 PROCEDURE — 81025 URINE PREGNANCY TEST: CPT

## 2020-07-10 RX ORDER — KETOROLAC TROMETHAMINE 30 MG/ML
30 SYRINGE (ML) INJECTION ONCE
Refills: 0 | Status: DISCONTINUED | OUTPATIENT
Start: 2020-07-10 | End: 2020-07-10

## 2020-07-10 RX ORDER — SODIUM CHLORIDE 9 MG/ML
1000 INJECTION INTRAMUSCULAR; INTRAVENOUS; SUBCUTANEOUS ONCE
Refills: 0 | Status: COMPLETED | OUTPATIENT
Start: 2020-07-10 | End: 2020-07-10

## 2020-07-10 RX ORDER — METOCLOPRAMIDE HCL 10 MG
10 TABLET ORAL ONCE
Refills: 0 | Status: COMPLETED | OUTPATIENT
Start: 2020-07-10 | End: 2020-07-10

## 2020-07-10 RX ADMIN — SODIUM CHLORIDE 1000 MILLILITER(S): 9 INJECTION INTRAMUSCULAR; INTRAVENOUS; SUBCUTANEOUS at 18:19

## 2020-07-10 RX ADMIN — Medication 30 MILLIGRAM(S): at 18:20

## 2020-07-10 RX ADMIN — SODIUM CHLORIDE 1000 MILLILITER(S): 9 INJECTION INTRAMUSCULAR; INTRAVENOUS; SUBCUTANEOUS at 19:21

## 2020-07-10 RX ADMIN — Medication 10 MILLIGRAM(S): at 18:20

## 2020-07-10 NOTE — ED ADULT NURSE NOTE - NEURO MENTATION
Ear Complaint HPI





- HPI Summary


HPI Summary: 





Pt presents accompanied by mother with complaints of right ear pain. Mom tells 

me that patient was swimming at the local hotel over the last week. Yesterday 

developed right ear pain. Denies fever, chills, cough, headache, or dizziness. 

Has been taking ibuprofen with good relief of pain.





- History of Current Complaint


Chief Complaint: UCEar


Stated Complaint: EAR PAIN


Time Seen by Provider: 03/31/18 17:09


Hx Obtained From: Patient, Family/Caretaker


Onset/Duration: Sudden Onset


Severity Initially: Mild


Severity Currently: Mild


Pain Intensity: 4


Pain Scale Used: 0-10 Numeric





- Allergies/Home Medications


Allergies/Adverse Reactions: 


 Allergies











Allergy/AdvReac Type Severity Reaction Status Date / Time


 


No Known Allergies Allergy   Verified 03/31/18 17:06











Home Medications: 


 Home Medications





Ibuprofen [Ibuprofen 100 MG/5 ML] 2.5 teasp PO DAILY PRN 03/31/18 [History 

Confirmed 03/31/18]











PMH/Surg Hx/FS Hx/Imm Hx


Previously Healthy: Yes





- Surgical History


Surgical History: None





- Family History


Known Family History: Positive: Hypertension, Diabetes


Family History: Asthma





- Social History


Occupation: Student


Lives: With Family


Alcohol Use: None


Substance Use Type: None


Smoking Status (MU): Never Smoked Tobacco





- Immunization History


Vaccination Up to Date: Yes





Review of Systems


Constitutional: Negative


Skin: Negative


Eyes: Negative


ENT: Ear Ache


Respiratory: Negative


Cardiovascular: Negative


Gastrointestinal: Negative


Neurovascular: Negative


Musculoskeletal: Negative


Neurological: Negative


Psychological: Negative


All Other Systems Reviewed And Are Negative: Yes





Physical Exam


Triage Information Reviewed: Yes


Appearance: Well-Appearing, No Pain Distress, Well-Nourished


Vital Signs: 


 Initial Vital Signs











Temp  98.3 F   03/31/18 17:02


 


Pulse  73   03/31/18 17:02


 


Resp  18   03/31/18 17:02


 


BP  109/64   03/31/18 17:02


 


Pulse Ox  100   03/31/18 17:02











Vital Signs Reviewed: Yes


Eyes: Positive: Conjunctiva Clear.  Negative: Conjunctiva Inflamed, Discharge


ENT: Positive: Hearing grossly normal, Pharynx normal, TM red - Right, Uvula 

midline, Other - Right ear canal with mild erythema and edema. No drainage 

noted. Mild tragus tenderness.  Negative: Pharyngeal erythema, Nasal congestion

, Nasal drainage, TM bulging, TM dull, Tonsillar swelling, Tonsillar exudate, 

Hoarse voice, Sinus tenderness


Neck: Positive: Supple, Nontender, No Lymphadenopathy


Respiratory: Positive: Lungs clear, Normal breath sounds, No respiratory 

distress, No accessory muscle use


Cardiovascular: Positive: RRR, No Murmur, Pulses Normal


Neurological: Positive: Alert


Psychological: Positive: Age Appropriate Behavior


Skin: Negative: rashes





Ear Complaint Course/Dx





- Course


Course Of Treatment: Right otitis externa





- Differential Dx/Diagnosis


Provider Diagnoses: Right otitis externa





Discharge





- Sign-Out/Discharge


Documenting (check all that apply): Discharge





- Discharge Plan


Condition: Stable


Disposition: HOME


Prescriptions: 


Ofloxacin 0.3% OTIC.SOL* [Floxin 0.3% OTIC.SOL*] 1 drop RIGHT EAR QID #1 btl


Patient Education Materials:  Otitis Externa (ED)


Forms:  *Gen. Provider Communication


Referrals: 


No Primary Care Phys,NOPCP [Primary Care Provider] - 


Additional Instructions: 


If you develop a fever, shortness of breath, chest pain, new or worsening 

symptoms - please call your PCP or go to the ED.


 








- Billing Disposition and Condition


Condition: STABLE


Disposition: HOME
normal

## 2020-07-10 NOTE — ED STATDOCS - PATIENT PORTAL LINK FT
You can access the FollowMyHealth Patient Portal offered by [facility] by registering at the following website: [email or web address]. By joining [employer]’s FollowMyHealth portal, you will also be able to view your health information using other applications (apps) compatible with our system.

## 2020-07-10 NOTE — ED ADULT NURSE NOTE - OBJECTIVE STATEMENT
Pt reports feeling nausea with no vomiting at this time. Pt denies pain to RN at this time. Pt denies fever/chills, change in bm, or urinary symptoms. Pt labs to ED and awaiting results at this time.

## 2020-07-10 NOTE — ED STATDOCS - ATTENDING CONTRIBUTION TO CARE
I, Sharyn Knight DO,  performed the initial face to face bedside interview with this patient regarding history of present illness, review of symptoms and relevant past medical, social and family history.  I completed an independent physical examination.  I was the initial provider who evaluated this patient. I have signed out the follow up of any pending tests (i.e. labs, radiological studies) to the ACP.  I have communicated the patient’s plan of care and disposition with the ACP.  The history, relevant review of systems, past medical and surgical history, medical decision making, and physical examination was documented by the scribe in my presence and I attest to the accuracy of the documentation.

## 2020-07-10 NOTE — ED STATDOCS - NSFOLLOWUPINSTRUCTIONS_ED_ALL_ED_FT
Acute Nausea and Vomiting    WHAT YOU NEED TO KNOW:    Acute nausea and vomiting start suddenly, worsen quickly, and last a short time.    DISCHARGE INSTRUCTIONS:    Return to the emergency department if:     You see blood in your vomit or your bowel movements.      You have sudden, severe pain in your chest and upper abdomen after hard vomiting or retching.      You have swelling in your neck and chest.       You are dizzy, cold, and thirsty and your eyes and mouth are dry.      You are urinating very little or not at all.      You have muscle weakness, leg cramps, and trouble breathing.       Your heart is beating much faster than normal.       You continue to vomit for more than 48 hours.     Contact your healthcare provider if:     You have frequent dry heaves (vomiting but nothing comes out).      Your nausea and vomiting does not get better or go away after you use medicine.      You have questions or concerns about your condition or treatment.    Medicines: You may need any of the following:     Medicines may be given to calm your stomach and stop your vomiting. You may also need medicines to help you feel more relaxed or to stop nausea and vomiting caused by motion sickness.      Gastrointestinal stimulants are used to help empty your stomach and bowels. This may help decrease nausea and vomiting.      Take your medicine as directed. Contact your healthcare provider if you think your medicine is not helping or if you have side effects. Tell him or her if you are allergic to any medicine. Keep a list of the medicines, vitamins, and herbs you take. Include the amounts, and when and why you take them. Bring the list or the pill bottles to follow-up visits. Carry your medicine list with you in case of an emergency.    Prevent or manage acute nausea and vomiting:     Do not drink alcohol. Alcohol may upset or irritate your stomach. Too much alcohol can also cause acute nausea and vomiting.      Control stress. Headaches due to stress may cause nausea and vomiting. Find ways to relax and manage your stress. Get more rest and sleep.      Drink more liquids as directed. Vomiting can lead to dehydration. It is important to drink more liquids to help replace lost body fluids. Ask your healthcare provider how much liquid to drink each day and which liquids are best for you. Your provider may recommend that you drink an oral rehydration solution (ORS). ORS contains water, salts, and sugar that are needed to replace the lost body fluids. Ask what kind of ORS to use, how much to drink, and where to get it.      Eat smaller meals, more often. Eat small amounts of food every 2 to 3 hours, even if you are not hungry. Food in your stomach may decrease your nausea.      Talk to your healthcare provider before you take over-the-counter (OTC) medicines. These medicines can cause serious problems if you use certain other medicines, or you have a medical condition. You may have problems if you use too much or use them for longer than the label says. Follow directions on the label carefully.     Follow up with your healthcare provider as directed: Write down your questions so you remember to ask them during your follow-up visits.    Acute Headache    WHAT YOU NEED TO KNOW:    An acute headache is pain or discomfort that starts suddenly and gets worse quickly. You may have an acute headache only when you feel stress or eat certain foods. Other acute headache pain can happen every day, and sometimes several times a day.     DISCHARGE INSTRUCTIONS:    Return to the emergency department if:     You have severe pain.      You have numbness or weakness on one side of your face or body.      You have a headache that occurs after a blow to the head, a fall, or other trauma.       You have a headache, are forgetful or confused, or have trouble speaking.      You have a headache, stiff neck, and a fever.    Contact your healthcare provider if:     You have a constant headache and are vomiting.      You have a headache each day that does not get better, even after treatment.      You have changes in your headaches, or new symptoms that occur when you have a headache.      You have questions or concerns about your condition or care.    Medicines: You may need any of the following:     Prescription pain medicine may be given. The medicine your healthcare provider recommends will depend on the kind of headaches you have. You will need to take prescription headache medicines as directed to prevent a problem called rebound headache. These headaches happen with regular use of pain relievers for headache disorders.      NSAIDs, such as ibuprofen, help decrease swelling, pain, and fever. This medicine is available with or without a doctor's order. NSAIDs can cause stomach bleeding or kidney problems in certain people. If you take blood thinner medicine, always ask your healthcare provider if NSAIDs are safe for you. Always read the medicine label and follow directions.      Acetaminophen decreases pain and fever. It is available without a doctor's order. Ask how much to take and how often to take it. Follow directions. Read the labels of all other medicines you are using to see if they also contain acetaminophen, or ask your doctor or pharmacist. Acetaminophen can cause liver damage if not taken correctly. Do not use more than 3 grams (3,000 milligrams) total of acetaminophen in one day.       Antidepressants may be given for some kinds of headaches.       Take your medicine as directed. Contact your healthcare provider if you think your medicine is not helping or if you have side effects. Tell him or her if you are allergic to any medicine. Keep a list of the medicines, vitamins, and herbs you take. Include the amounts, and when and why you take them. Bring the list or the pill bottles to follow-up visits. Carry your medicine list with you in case of an emergency.    Manage your symptoms:     Apply heat or ice on the headache area. Use a heat or ice pack. For an ice pack, you can also put crushed ice in a plastic bag. Cover the pack or bag with a towel before you apply it to your skin. Ice and heat both help decrease pain, and heat also helps decrease muscle spasms. Apply heat for 20 to 30 minutes every 2 hours. Apply ice for 15 to 20 minutes every hour. Apply heat or ice for as long and for as many days as directed. You may alternate heat and ice.      Relax your muscles. Lie down in a comfortable position and close your eyes. Relax your muscles slowly. Start at your toes and work your way up your body.      Keep a record of your headaches. Write down when your headaches start and stop. Include your symptoms and what you were doing when the headache began. Record what you ate or drank for 24 hours before the headache started. Describe the pain and where it hurts. Keep track of what you did to treat your headache and if it worked.     Prevent an acute headache:     Avoid anything that triggers an acute headache. Examples include exposure to chemicals, going to high altitude, or not getting enough sleep. Create a regular sleep routine. Go to sleep at the same time and wake up at the same time each day. Do not use electronic devices before bedtime. These may trigger a headache or prevent you from sleeping well.      Do not smoke. Nicotine and other chemicals in cigarettes and cigars can trigger an acute headache or make it worse. Ask your healthcare provider for information if you currently smoke and need help to quit. E-cigarettes or smokeless tobacco still contain nicotine. Talk to your healthcare provider before you use these products.       Limit alcohol as directed. Alcohol can trigger an acute headache or make it worse. If you have cluster headaches, do not drink alcohol during an episode. For other types of headaches, ask your healthcare provider if it is safe for you to drink alcohol. Ask how much is safe for you to drink, and how often.      Exercise as directed. Exercise can reduce tension and help with headache pain. Aim for 30 minutes of physical activity on most days of the week. Your healthcare provider can help you create an exercise plan.      Eat a variety of healthy foods. Healthy foods include fruits, vegetables, low-fat dairy products, lean meats, fish, whole grains, and cooked beans. Your healthcare provider or dietitian can help you create meals plans if you need to avoid foods that trigger headaches.    Follow up with your healthcare provider as directed: Bring your headache record with you when you see your healthcare provider. Write down your questions so you remember to ask them during your visits.    Rest. Drink plenty of fluids. Tylenol 650 mg. PO every 4 hrs. for pain. Follow up with PMD for further evaluation.

## 2020-07-10 NOTE — ED STATDOCS - PROGRESS NOTE DETAILS
34 yr. old female PMH: presents to ED with right flank pain onset 1 week ago, headache and nausea onset 2 days ago. No dysuria Nio fever or chills. No chest pain or difficulty breathing. Seen and examined by attending in intake. Plan: IV,IVF,Labs Will F/U with results and re evaluate. Bernardo PHILIP 34 yr. old female PMH: presents to ED with right flank pain onset 1 week ago, headache and nausea onset 2 days ago. No dysuria Nio fever or chills. No chest pain or difficulty breathing. Seen and examined by attending in intake. Plan: IV, IVF, Labs Toradol and Reglan. Will F/U with results and re evaluate. Bernardo NP Results of Lab work reviewed with patient. Feeling better. Bernardo PHILIP Antonia GIL: Patient feeling better at this time; instructed to f/u with pmd in 1-2 days without fail.

## 2020-07-10 NOTE — ED ADULT NURSE NOTE - NSIMPLEMENTINTERV_GEN_ALL_ED
Implemented All Universal Safety Interventions:  [place] to call system. Call bell, personal items and telephone within reach. Instruct patient to call for assistance. Room bathroom lighting operational. Non-slip footwear when patient is off stretcher. Physically safe environment: no spills, clutter or unnecessary equipment. Stretcher in lowest position, wheels locked, appropriate side rails in place.

## 2020-07-10 NOTE — ED STATDOCS - OBJECTIVE STATEMENT
35 y/o female with no significant PMHx presents to the ED c/o right-sided flank pain x1 week and headache and nausea that began today. Pt reports right flank pain that began 1 week ago, no trauma, no injury. Pt reports today pt had frontal and posterior headache with associated nausea. Denies vomiting, fever, chills, neck pain, chest pain, abd pain, SOB. Pt came to ED be evaluated. No urinary complaints No other complaints at this time. Allergies: Penicillin. PCP: Laurie Perez.

## 2020-07-10 NOTE — ED STATDOCS - CARE PROVIDER_API CALL
Stephen Kowalski  INTERNAL MEDICINE  [address] [place] [address]  Phone: [phone]  Fax: [phone]  Follow Up Time:

## 2020-07-11 LAB
CULTURE RESULTS: SIGNIFICANT CHANGE UP
SPECIMEN SOURCE: SIGNIFICANT CHANGE UP

## 2021-10-03 ENCOUNTER — EMERGENCY (EMERGENCY)
Facility: HOSPITAL | Age: 36
LOS: 0 days | Discharge: HOME | End: 2021-10-03
Attending: EMERGENCY MEDICINE
Payer: MEDICAID

## 2021-10-03 VITALS
DIASTOLIC BLOOD PRESSURE: 91 MMHG | TEMPERATURE: 99 F | RESPIRATION RATE: 18 BRPM | OXYGEN SATURATION: 98 % | SYSTOLIC BLOOD PRESSURE: 136 MMHG | HEART RATE: 98 BPM

## 2021-10-03 VITALS — HEIGHT: 61 IN | WEIGHT: 181 LBS

## 2021-10-03 DIAGNOSIS — Z88.0 ALLERGY STATUS TO PENICILLIN: ICD-10-CM

## 2021-10-03 DIAGNOSIS — K08.89 OTHER SPECIFIED DISORDERS OF TEETH AND SUPPORTING STRUCTURES: ICD-10-CM

## 2021-10-03 DIAGNOSIS — Z91.018 ALLERGY TO OTHER FOODS: ICD-10-CM

## 2021-10-03 PROCEDURE — 99283 EMERGENCY DEPT VISIT LOW MDM: CPT

## 2021-10-03 RX ORDER — OXYCODONE AND ACETAMINOPHEN 5; 325 MG/1; MG/1
1 TABLET ORAL ONCE
Refills: 0 | Status: DISCONTINUED | OUTPATIENT
Start: 2021-10-03 | End: 2021-10-03

## 2021-10-03 RX ADMIN — OXYCODONE AND ACETAMINOPHEN 1 TABLET(S): 5; 325 TABLET ORAL at 22:50

## 2021-10-03 RX ADMIN — Medication 300 MILLIGRAM(S): at 22:50

## 2021-10-03 NOTE — ED STATDOCS - ENMT, MLM
Nasal mucosa clear.  Mouth: poor dentition, no periapical abscess, no facial swelling or drooling.  Throat has no vesicles, no oropharyngeal exudates and uvula is midline.

## 2021-10-03 NOTE — ED ADULT TRIAGE NOTE - CHIEF COMPLAINT QUOTE
pt c/o pain to top left molar, possible infection or abscess. pt took meloxicam at 9pm and ibuprofen with no relief

## 2021-10-03 NOTE — ED STATDOCS - PATIENT PORTAL LINK FT
You can access the FollowMyHealth Patient Portal offered by [facility] by registering at the following website: [email or web address]. By joining [employer]’s FollowMyHealth portal, you will also be able to view your health information using other applications (apps) compatible with our system.

## 2021-10-03 NOTE — ED STATDOCS - OBJECTIVE STATEMENT
34 y/o female with no pertinent PMHx, presents to the ED c/o pain to L upper tooth x 3 days. Pt states pain worsened yesterday. Allergic to Penicillin. No other complaints.

## 2021-10-03 NOTE — ED STATDOCS - CARE PROVIDER_API CALL
Andrea Montaño (DMD; MD)  OralMaxillofacial Surgery  [address], [address]  [place] [address]  Phone: [phone]  Fax: [phone]  Follow Up Time:

## 2021-10-03 NOTE — ED ADULT TRIAGE NOTE - IDEAL BODY WEIGHT(KG)
[facility]   Brief Operative Note    Pre-operative diagnosis: screening   Post-operative diagnosis normal colon   Procedure: Procedure(s):  colonoscopy - Wound Class: II-Clean Contaminated   Surgeon(s): Surgeon(s) and Role:     * Aris Whittaker MD - Primary   Estimated blood loss: * No values recorded between 3/2/2018 12:00 AM and 3/2/2018  7:59 AM *    Specimens: * No specimens in log *   Findings: Normal colon         
48

## 2021-10-03 NOTE — ED STATDOCS - NSFOLLOWUPINSTRUCTIONS_ED_ALL_ED_FT
Toothache    WHAT YOU NEED TO KNOW:    A toothache is pain that is caused by irritation of the nerves in the center of your tooth. The irritation may be caused by several problems, such as a cavity, an infection, a cracked tooth, or gum disease. Tooth Anatomy         DISCHARGE INSTRUCTIONS:    Return to the emergency department if:     You have trouble breathing or swallowing.       You have swelling in your face or neck.     Contact your dentist if:     You have a fever and chills.       You have trouble opening or closing your mouth.       You have swelling around your tooth.       You have questions or concerns about your condition or care.    Medicines: You may need any of the following:     NSAIDs, such as ibuprofen, help decrease swelling, pain, and fever. This medicine is available with or without a doctor's order. NSAIDs can cause stomach bleeding or kidney problems in certain people. If you take blood thinner medicine, always ask if NSAIDs are safe for you. Always read the medicine label and follow directions. Do not give these medicines to children under 6 months of age without direction from your child's healthcare provider.      Acetaminophen decreases pain and fever. It is available without a doctor's order. Ask how much to take and how often to take it. Follow directions. Acetaminophen can cause liver damage if not taken correctly.      Prescription pain medicine may be given. Ask your healthcare provider how to take this medicine safely. Some prescription pain medicines contain acetaminophen. Do not take other medicines that contain acetaminophen without talking to your healthcare provider. Too much acetaminophen may cause liver damage. Prescription pain medicine may cause constipation. Ask your healthcare provider how to prevent or treat constipation.       Antibiotics help treat or prevent a bacterial infection.       Take your medicine as directed. Contact your healthcare provider if you think your medicine is not helping or if you have side effects. Tell him of her if you are allergic to any medicine. Keep a list of the medicines, vitamins, and herbs you take. Include the amounts, and when and why you take them. Bring the list or the pill bottles to follow-up visits. Carry your medicine list with you in case of an emergency.    Self-care:     Rinse your mouth with warm salt water 4 times a day or as directed.       Eat soft foods to help relieve pain caused by chewing.       Apply ice on your jaw or cheek for 15 to 20 minutes every hour or as directed. Use an ice pack, or put crushed ice in a plastic bag. Cover it with a towel before you apply it. Ice helps prevent tissue damage and decreases swelling and pain.    Help prevent a toothache:     Brush your teeth at least 2 times a day.      Use dental floss to clean between your teeth at least 1 time a day.      See your dentist regularly every 6 months for dental cleanings and oral exams.    Follow up with your dentist as directed: You may be referred to a dental surgeon. Write down your questions so you remember to ask them during your visit.

## 2021-10-03 NOTE — ED STATDOCS - PROGRESS NOTE DETAILS
patient to be treated with pain control and antibiotics for tooth ache and referred to dentist for follow up -Josemanuel Solis PA-C

## 2022-02-22 NOTE — ED ADULT NURSE NOTE - CHIEF COMPLAINT
Hpi Title: Evaluation of Skin Lesions How Severe Are Your Spot(S)?: mild Have Your Spot(S) Been Treated In The Past?: has not been treated The patient is a 34y Female complaining of flu-like symptoms.

## 2022-08-30 ENCOUNTER — EMERGENCY (EMERGENCY)
Facility: HOSPITAL | Age: 37
LOS: 0 days | Discharge: HOME | End: 2022-08-30
Attending: EMERGENCY MEDICINE
Payer: MEDICAID

## 2022-08-30 VITALS
OXYGEN SATURATION: 100 % | DIASTOLIC BLOOD PRESSURE: 90 MMHG | TEMPERATURE: 99 F | SYSTOLIC BLOOD PRESSURE: 132 MMHG | RESPIRATION RATE: 18 BRPM | HEART RATE: 82 BPM

## 2022-08-30 VITALS — HEIGHT: 61 IN | WEIGHT: 197.98 LBS

## 2022-08-30 DIAGNOSIS — Z88.0 ALLERGY STATUS TO PENICILLIN: ICD-10-CM

## 2022-08-30 DIAGNOSIS — Z91.018 ALLERGY TO OTHER FOODS: ICD-10-CM

## 2022-08-30 DIAGNOSIS — Z87.440 PERSONAL HISTORY OF URINARY (TRACT) INFECTIONS: ICD-10-CM

## 2022-08-30 DIAGNOSIS — R10.9 UNSPECIFIED ABDOMINAL PAIN: ICD-10-CM

## 2022-08-30 LAB
ALBUMIN SERPL ELPH-MCNC: 3.7 G/DL — SIGNIFICANT CHANGE UP (ref 3.3–5)
ALP SERPL-CCNC: 59 U/L — SIGNIFICANT CHANGE UP (ref 40–120)
ALT FLD-CCNC: 31 U/L — SIGNIFICANT CHANGE UP (ref 12–78)
ANION GAP SERPL CALC-SCNC: 4 MMOL/L — LOW (ref 5–17)
APPEARANCE UR: CLEAR — SIGNIFICANT CHANGE UP
AST SERPL-CCNC: 23 U/L — SIGNIFICANT CHANGE UP (ref 15–37)
BASOPHILS # BLD AUTO: 0.04 K/UL — SIGNIFICANT CHANGE UP (ref 0–0.2)
BASOPHILS NFR BLD AUTO: 0.6 % — SIGNIFICANT CHANGE UP (ref 0–2)
BILIRUB SERPL-MCNC: 0.5 MG/DL — SIGNIFICANT CHANGE UP (ref 0.2–1.2)
BILIRUB UR-MCNC: NEGATIVE — SIGNIFICANT CHANGE UP
BUN SERPL-MCNC: 9 MG/DL — SIGNIFICANT CHANGE UP (ref 7–23)
CALCIUM SERPL-MCNC: 9 MG/DL — SIGNIFICANT CHANGE UP (ref 8.5–10.1)
CHLORIDE SERPL-SCNC: 109 MMOL/L — HIGH (ref 96–108)
CO2 SERPL-SCNC: 24 MMOL/L — SIGNIFICANT CHANGE UP (ref 22–31)
COLOR SPEC: YELLOW — SIGNIFICANT CHANGE UP
CREAT SERPL-MCNC: 0.79 MG/DL — SIGNIFICANT CHANGE UP (ref 0.5–1.3)
DIFF PNL FLD: ABNORMAL
EGFR: 99 ML/MIN/1.73M2 — SIGNIFICANT CHANGE UP
EOSINOPHIL # BLD AUTO: 0.41 K/UL — SIGNIFICANT CHANGE UP (ref 0–0.5)
EOSINOPHIL NFR BLD AUTO: 5.8 % — SIGNIFICANT CHANGE UP (ref 0–6)
GLUCOSE SERPL-MCNC: 92 MG/DL — SIGNIFICANT CHANGE UP (ref 70–99)
GLUCOSE UR QL: NEGATIVE — SIGNIFICANT CHANGE UP
HCT VFR BLD CALC: 37.9 % — SIGNIFICANT CHANGE UP (ref 34.5–45)
HGB BLD-MCNC: 12.7 G/DL — SIGNIFICANT CHANGE UP (ref 11.5–15.5)
IMM GRANULOCYTES NFR BLD AUTO: 0.1 % — SIGNIFICANT CHANGE UP (ref 0–1.5)
KETONES UR-MCNC: NEGATIVE — SIGNIFICANT CHANGE UP
LACTATE SERPL-SCNC: 1.6 MMOL/L — SIGNIFICANT CHANGE UP (ref 0.7–2)
LEUKOCYTE ESTERASE UR-ACNC: NEGATIVE — SIGNIFICANT CHANGE UP
LIDOCAIN IGE QN: 146 U/L — SIGNIFICANT CHANGE UP (ref 73–393)
LYMPHOCYTES # BLD AUTO: 1.7 K/UL — SIGNIFICANT CHANGE UP (ref 1–3.3)
LYMPHOCYTES # BLD AUTO: 23.9 % — SIGNIFICANT CHANGE UP (ref 13–44)
MCHC RBC-ENTMCNC: 29.7 PG — SIGNIFICANT CHANGE UP (ref 27–34)
MCHC RBC-ENTMCNC: 33.5 GM/DL — SIGNIFICANT CHANGE UP (ref 32–36)
MCV RBC AUTO: 88.8 FL — SIGNIFICANT CHANGE UP (ref 80–100)
MONOCYTES # BLD AUTO: 0.47 K/UL — SIGNIFICANT CHANGE UP (ref 0–0.9)
MONOCYTES NFR BLD AUTO: 6.6 % — SIGNIFICANT CHANGE UP (ref 2–14)
NEUTROPHILS # BLD AUTO: 4.49 K/UL — SIGNIFICANT CHANGE UP (ref 1.8–7.4)
NEUTROPHILS NFR BLD AUTO: 63 % — SIGNIFICANT CHANGE UP (ref 43–77)
NITRITE UR-MCNC: NEGATIVE — SIGNIFICANT CHANGE UP
PH UR: 5 — SIGNIFICANT CHANGE UP (ref 5–8)
PLATELET # BLD AUTO: 301 K/UL — SIGNIFICANT CHANGE UP (ref 150–400)
POTASSIUM SERPL-MCNC: 4.3 MMOL/L — SIGNIFICANT CHANGE UP (ref 3.5–5.3)
POTASSIUM SERPL-SCNC: 4.3 MMOL/L — SIGNIFICANT CHANGE UP (ref 3.5–5.3)
PROT SERPL-MCNC: 8.2 GM/DL — SIGNIFICANT CHANGE UP (ref 6–8.3)
PROT UR-MCNC: NEGATIVE — SIGNIFICANT CHANGE UP
RBC # BLD: 4.27 M/UL — SIGNIFICANT CHANGE UP (ref 3.8–5.2)
RBC # FLD: 13 % — SIGNIFICANT CHANGE UP (ref 10.3–14.5)
SODIUM SERPL-SCNC: 137 MMOL/L — SIGNIFICANT CHANGE UP (ref 135–145)
SP GR SPEC: 1.01 — SIGNIFICANT CHANGE UP (ref 1.01–1.02)
UROBILINOGEN FLD QL: NEGATIVE — SIGNIFICANT CHANGE UP
WBC # BLD: 7.12 K/UL — SIGNIFICANT CHANGE UP (ref 3.8–10.5)
WBC # FLD AUTO: 7.12 K/UL — SIGNIFICANT CHANGE UP (ref 3.8–10.5)

## 2022-08-30 PROCEDURE — 74177 CT ABD & PELVIS W/CONTRAST: CPT | Mod: MA

## 2022-08-30 PROCEDURE — 86900 BLOOD TYPING SEROLOGIC ABO: CPT

## 2022-08-30 PROCEDURE — 86901 BLOOD TYPING SEROLOGIC RH(D): CPT

## 2022-08-30 PROCEDURE — 76830 TRANSVAGINAL US NON-OB: CPT

## 2022-08-30 PROCEDURE — 99285 EMERGENCY DEPT VISIT HI MDM: CPT

## 2022-08-30 PROCEDURE — 85025 COMPLETE CBC W/AUTO DIFF WBC: CPT

## 2022-08-30 PROCEDURE — 36415 COLL VENOUS BLD VENIPUNCTURE: CPT

## 2022-08-30 PROCEDURE — 76830 TRANSVAGINAL US NON-OB: CPT | Mod: 26

## 2022-08-30 PROCEDURE — 96361 HYDRATE IV INFUSION ADD-ON: CPT

## 2022-08-30 PROCEDURE — 99285 EMERGENCY DEPT VISIT HI MDM: CPT | Mod: 25

## 2022-08-30 PROCEDURE — 80053 COMPREHEN METABOLIC PANEL: CPT

## 2022-08-30 PROCEDURE — 83690 ASSAY OF LIPASE: CPT

## 2022-08-30 PROCEDURE — 93975 VASCULAR STUDY: CPT

## 2022-08-30 PROCEDURE — 93975 VASCULAR STUDY: CPT | Mod: 26

## 2022-08-30 PROCEDURE — 83605 ASSAY OF LACTIC ACID: CPT

## 2022-08-30 PROCEDURE — 86850 RBC ANTIBODY SCREEN: CPT

## 2022-08-30 PROCEDURE — 96374 THER/PROPH/DIAG INJ IV PUSH: CPT

## 2022-08-30 PROCEDURE — 74177 CT ABD & PELVIS W/CONTRAST: CPT | Mod: 26,MA

## 2022-08-30 PROCEDURE — 81001 URINALYSIS AUTO W/SCOPE: CPT

## 2022-08-30 RX ORDER — SODIUM CHLORIDE 9 MG/ML
1000 INJECTION INTRAMUSCULAR; INTRAVENOUS; SUBCUTANEOUS ONCE
Refills: 0 | Status: COMPLETED | OUTPATIENT
Start: 2022-08-30 | End: 2022-08-30

## 2022-08-30 RX ORDER — ACETAMINOPHEN 500 MG
1000 TABLET ORAL ONCE
Refills: 0 | Status: COMPLETED | OUTPATIENT
Start: 2022-08-30 | End: 2022-08-30

## 2022-08-30 RX ORDER — KETOROLAC TROMETHAMINE 30 MG/ML
15 SYRINGE (ML) INJECTION ONCE
Refills: 0 | Status: DISCONTINUED | OUTPATIENT
Start: 2022-08-30 | End: 2022-08-30

## 2022-08-30 RX ADMIN — Medication 15 MILLIGRAM(S): at 11:05

## 2022-08-30 RX ADMIN — SODIUM CHLORIDE 1000 MILLILITER(S): 9 INJECTION INTRAMUSCULAR; INTRAVENOUS; SUBCUTANEOUS at 11:05

## 2022-08-30 RX ADMIN — SODIUM CHLORIDE 1000 MILLILITER(S): 9 INJECTION INTRAMUSCULAR; INTRAVENOUS; SUBCUTANEOUS at 12:05

## 2022-08-30 NOTE — ED STATDOCS - NS ED ROS FT
Constitutional: No fevers, chills, or sweats.  Cardiac: No chest pain, exertional dyspnea, orthopnea  Respiratory: No shortness of breath, no cough  GI: no N/V/D. +abd pain   : +right flank pain  Neuro: No headaches, no neck pain/stiffness, no numbness  All other systems reviewed and are negative unless otherwise stated in the HPI.

## 2022-08-30 NOTE — ED STATDOCS - NS_ ATTENDINGSCRIBEDETAILS _ED_A_ED_FT
I, Gui Rosas MD,  performed the initial face to face bedside interview with this patient regarding history of present illness, review of symptoms and relevant past medical, social and family history.  I completed an independent physical examination.  I was the initial provider who evaluated this patient. I have signed out the follow up of any pending tests (i.e. labs, radiological studies) to the RENZO.  I have communicated the patient’s plan of care and disposition with the RNEZO.  The history, relevant review of systems, past medical and surgical history, medical decision making, and physical examination was documented by the scribe in my presence and I attest to the accuracy of the documentation.

## 2022-08-30 NOTE — ED STATDOCS - PATIENT PORTAL LINK FT
You can access the FollowMyHealth Patient Portal offered by [facility] by registering at the following website: [email or web address]. By joining [facility]’s FollowMyHealth portal, you will also be able to view your health information using other applications (apps) compatible with our system.

## 2022-08-30 NOTE — ED ADULT TRIAGE NOTE - CHIEF COMPLAINT QUOTE
pt c/o RLQ abdominal pain radiating to groin & right flank x 2 days but worsening yesterday. hx- cholecystectomy. pt had recent UTI 2 weeks ago but did not take antibiotics.

## 2022-08-30 NOTE — ED STATDOCS - ATTENDING APP SHARED VISIT CONTRIBUTION OF CARE
I, Gui Rosas MD, personally saw the patient with RENZO.  I have personally performed a face to face diagnostic evaluation on this patient.  I have reviewed the RENZO note and agree with the history, exam, and plan of care, except as noted.

## 2022-08-30 NOTE — ED STATDOCS - CARE PROVIDER_API CALL
Riki Hammond)  Gastroenterology; Internal Medicine  [address] [place] [address]  Phone: [phone]  Fax: [phone]  Follow Up Time: Urgent

## 2022-08-30 NOTE — ED STATDOCS - CPE ED MUSC NORM
normal... Peng Advancement Flap Text: The defect edges were debeveled with a #15 scalpel blade.  Given the location of the defect, shape of the defect and the proximity to free margins a Peng advancement flap was deemed most appropriate.  Using a sterile surgical marker, an appropriate advancement flap was drawn incorporating the defect and placing the expected incisions within the relaxed skin tension lines where possible. The area thus outlined was incised deep to adipose tissue with a #15 scalpel blade.  The skin margins were undermined to an appropriate distance in all directions utilizing iris scissors.

## 2022-08-30 NOTE — ED STATDOCS - PHYSICAL EXAMINATION
General: AAOx3, NAD  HEENT: NCAT  Cardiac: Normal rate and rhythym, no murmurs, normal peripheral perfusion  Respiratory: Normal rate and effort. CTAB  GI: Soft, nondistended. Moderate right flank and mild RLQ tenderness.   Neuro: No focal deficits. JUAREZ equally x4, sensation to light touch intact throughout  MSK: FROMx4, no focal bony tenderness, no peripheral edema  Skin: No rash attending: General: AAOx3, NAD  HEENT: NCAT  Cardiac: Normal rate and rhythym, no murmurs, normal peripheral perfusion  Respiratory: Normal rate and effort. CTAB  GI: Soft, nondistended. Moderate right flank and mild RLQ tenderness.   Neuro: No focal deficits. JUAREZ equally x4, sensation to light touch intact throughout  MSK: FROMx4, no focal bony tenderness, no peripheral edema  Skin: No rash

## 2022-08-30 NOTE — ED STATDOCS - NS ED ATTENDING STATEMENT MOD
This was a shared visit with the RENZO. I reviewed and verified the documentation and independently performed the documented:

## 2022-08-30 NOTE — ED STATDOCS - PROGRESS NOTE DETAILS
PA note: CT scan NEG. Blood work NEG. All labwork/radiology results discussed in detail with patient. Patient re-examined and re-evaluated. Patient feels much better at this time. ED evaluation, Diagnosis and management discussed with the patient in detail. Workup results discussed with ED attending, OK to dc home. Close GI MD follow up encouraged, aftercare to assist with scheduling appointment ASAP. Strict ED return instructions discussed in detail and patient given the opportunity to ask any questions about their discharge diagnosis and instructions. Patient verbalized understanding. ~ Kalia Damon PA-C PA: Patient is a 35 y/o female with no significant PMHx who presents to [facility] c/o right flank pain radiating to right side of ABD. Pt was diagnosed with UTI 2 weeks ago, was placed on abx but did not take medication. Denies hematuria, n/v/d. No hx of kidney stone. No other complaints at this time. ~Kalia Damon PA-C

## 2022-08-30 NOTE — ED STATDOCS - OBJECTIVE STATEMENT
35 y/o female presents to the ED c/o right flank pain. Pain radiates to abd. Pt states she was diagnosed with UTI 2 weeks ago, was placed on abx but did not take medication. Denies hematuria, n/v/d. No hx of kidney stone. No other complaints at this time.

## 2022-08-30 NOTE — ED STATDOCS - CLINICAL SUMMARY MEDICAL DECISION MAKING FREE TEXT BOX
Plan: labs, urine, symptom control, pelvic US, CT abd pelvis and reassess. Plan: labs, urine, symptom control, pelvic US, CT abd pelvis and reassess.    PA note: CT scan NEG. Blood work NEG. All labwork/radiology results discussed in detail with patient. Patient re-examined and re-evaluated. Patient feels much better at this time. ED evaluation, Diagnosis and management discussed with the patient in detail. Workup results discussed with ED attending, OK to dc home. Close GI MD follow up encouraged, aftercare to assist with scheduling appointment ASAP. Strict ED return instructions discussed in detail and patient given the opportunity to ask any questions about their discharge diagnosis and instructions. Patient verbalized understanding. ~ Kalia Damon PA-C

## 2022-10-21 NOTE — ED STATDOCS - ENMT, MLM
Pt called, unsure if she was bitten by a spider but states she has a area on her thigh that she believes may be a spider bite and is concerned about developing cellulitis  Pt asking if something can be sent to pharmacy  Pt is going to try to send a picture through [employer] just to make you aware also    ty Nasal mucosa clear.  Mouth with normal mucosa. Throat is clear.

## 2022-10-31 ENCOUNTER — EMERGENCY (EMERGENCY)
Facility: HOSPITAL | Age: 37
LOS: 0 days | Discharge: HOME | End: 2022-10-31
Attending: EMERGENCY MEDICINE
Payer: MEDICAID

## 2022-10-31 VITALS — WEIGHT: 197.98 LBS | HEIGHT: 61 IN

## 2022-10-31 VITALS
SYSTOLIC BLOOD PRESSURE: 128 MMHG | DIASTOLIC BLOOD PRESSURE: 84 MMHG | RESPIRATION RATE: 16 BRPM | HEART RATE: 84 BPM | TEMPERATURE: 98 F | OXYGEN SATURATION: 100 %

## 2022-10-31 DIAGNOSIS — Z20.822 CONTACT WITH AND (SUSPECTED) EXPOSURE TO COVID-19: ICD-10-CM

## 2022-10-31 DIAGNOSIS — R11.2 NAUSEA WITH VOMITING, UNSPECIFIED: ICD-10-CM

## 2022-10-31 DIAGNOSIS — Z88.0 ALLERGY STATUS TO PENICILLIN: ICD-10-CM

## 2022-10-31 DIAGNOSIS — Z91.018 ALLERGY TO OTHER FOODS: ICD-10-CM

## 2022-10-31 DIAGNOSIS — R10.9 UNSPECIFIED ABDOMINAL PAIN: ICD-10-CM

## 2022-10-31 DIAGNOSIS — B34.9 VIRAL INFECTION, UNSPECIFIED: ICD-10-CM

## 2022-10-31 DIAGNOSIS — R42 DIZZINESS AND GIDDINESS: ICD-10-CM

## 2022-10-31 LAB
APPEARANCE UR: CLEAR — SIGNIFICANT CHANGE UP
BILIRUB UR-MCNC: NEGATIVE — SIGNIFICANT CHANGE UP
COLOR SPEC: YELLOW — SIGNIFICANT CHANGE UP
DIFF PNL FLD: ABNORMAL
FLUAV AG NPH QL: SIGNIFICANT CHANGE UP
FLUBV AG NPH QL: SIGNIFICANT CHANGE UP
GLUCOSE UR QL: NEGATIVE — SIGNIFICANT CHANGE UP
KETONES UR-MCNC: NEGATIVE — SIGNIFICANT CHANGE UP
LEUKOCYTE ESTERASE UR-ACNC: NEGATIVE — SIGNIFICANT CHANGE UP
NITRITE UR-MCNC: NEGATIVE — SIGNIFICANT CHANGE UP
PH UR: 8 — SIGNIFICANT CHANGE UP (ref 5–8)
PROT UR-MCNC: NEGATIVE — SIGNIFICANT CHANGE UP
RSV RNA NPH QL NAA+NON-PROBE: SIGNIFICANT CHANGE UP
SARS-COV-2 RNA SPEC QL NAA+PROBE: SIGNIFICANT CHANGE UP
SP GR SPEC: 1.01 — SIGNIFICANT CHANGE UP (ref 1.01–1.02)
UROBILINOGEN FLD QL: NEGATIVE — SIGNIFICANT CHANGE UP

## 2022-10-31 PROCEDURE — 99284 EMERGENCY DEPT VISIT MOD MDM: CPT

## 2022-10-31 PROCEDURE — 85025 COMPLETE CBC W/AUTO DIFF WBC: CPT

## 2022-10-31 PROCEDURE — 80053 COMPREHEN METABOLIC PANEL: CPT

## 2022-10-31 PROCEDURE — 81001 URINALYSIS AUTO W/SCOPE: CPT

## 2022-10-31 PROCEDURE — 99283 EMERGENCY DEPT VISIT LOW MDM: CPT

## 2022-10-31 PROCEDURE — 36415 COLL VENOUS BLD VENIPUNCTURE: CPT

## 2022-10-31 PROCEDURE — 0241U: CPT

## 2022-10-31 PROCEDURE — 83690 ASSAY OF LIPASE: CPT

## 2022-10-31 RX ORDER — SODIUM CHLORIDE 9 MG/ML
1000 INJECTION INTRAMUSCULAR; INTRAVENOUS; SUBCUTANEOUS ONCE
Refills: 0 | Status: DISCONTINUED | OUTPATIENT
Start: 2022-10-31 | End: 2022-10-31

## 2022-10-31 RX ORDER — IBUPROFEN 200 MG
400 TABLET ORAL ONCE
Refills: 0 | Status: COMPLETED | OUTPATIENT
Start: 2022-10-31 | End: 2022-10-31

## 2022-10-31 RX ORDER — ONDANSETRON 8 MG/1
1 TABLET, FILM COATED ORAL
Qty: 15 | Refills: 0
Start: 2022-10-31 | End: 2022-11-04

## 2022-10-31 RX ORDER — KETOROLAC TROMETHAMINE 30 MG/ML
15 SYRINGE (ML) INJECTION ONCE
Refills: 0 | Status: DISCONTINUED | OUTPATIENT
Start: 2022-10-31 | End: 2022-10-31

## 2022-10-31 RX ORDER — ONDANSETRON 8 MG/1
4 TABLET, FILM COATED ORAL ONCE
Refills: 0 | Status: DISCONTINUED | OUTPATIENT
Start: 2022-10-31 | End: 2022-10-31

## 2022-10-31 RX ORDER — ONDANSETRON 8 MG/1
4 TABLET, FILM COATED ORAL ONCE
Refills: 0 | Status: COMPLETED | OUTPATIENT
Start: 2022-10-31 | End: 2022-10-31

## 2022-10-31 RX ADMIN — ONDANSETRON 4 MILLIGRAM(S): 8 TABLET, FILM COATED ORAL at 12:09

## 2022-10-31 NOTE — ED STATDOCS - ATTENDING CONTRIBUTION TO CARE
I, Gui Rosas MD, personally saw the patient with resident.  I have personally performed a face to face diagnostic evaluation on this patient.  I have reviewed the resident note and agree with the history, exam, and plan of care, except as noted.

## 2022-10-31 NOTE — ED STATDOCS - NS ED ROS FT
Constitutional: No fevers, chills, or sweats.  Cardiac: No chest pain, exertional dyspnea, orthopnea  Respiratory: No shortness of breath, no cough  GI: +abdominal pain, +N/V  Neuro: No headaches, no neck pain/stiffness, no numbness  All other systems reviewed and are negative unless otherwise stated in the HPI.

## 2022-10-31 NOTE — ED STATDOCS - OBJECTIVE STATEMENT
37 y/o female w/ no pertinent PMHx presents to the ED c/o abd pain and n/v x today. Pt also c/o lightheadedness and HA since last night. Pt reports her son is sick at home w/ the same Sx. Pt denies diarrhea but endorses loose stools. Denies fevers.

## 2022-10-31 NOTE — ED ADULT TRIAGE NOTE - CHIEF COMPLAINT QUOTE
Pt c/o abdominal pain, N/V, and headache starting today. Pt states her son was sick yesterday with the same symptoms. Denies medical hx and fevers.

## 2022-10-31 NOTE — ED STATDOCS - CLINICAL SUMMARY MEDICAL DECISION MAKING FREE TEXT BOX
Sx likely consistent w/ viral syndrome/gastroenteritis. Plan for IV fluids, Sx control, labs, and reassess.

## 2022-10-31 NOTE — ED STATDOCS - NSFOLLOWUPINSTRUCTIONS_ED_ALL_ED_FT
You were seen in the Emergency Department for viral illness.      1) Continue all previously prescribed medications as directed.    2) Follow up with your primary care physician - take copies of your results.    3) Return to the Emergency Department for worsening or persistent symptoms, and/or ANY NEW OR CONCERNING SYMPTOMS.      Viral Illness, Adult      Viruses are tiny germs that can get into a person's body and cause illness. There are many different types of viruses, and they cause many types of illness. Viral illnesses can range from mild to severe. They can affect various parts of the body.    Short-term conditions that are caused by a virus include colds and the flu (influenza). Long-term conditions that are caused by a virus include herpes, shingles, and HIV (human immunodeficiency virus) infection. A few viruses have been linked to certain cancers.      What are the causes?    Many types of viruses can cause illness. Viruses invade cells in your body, multiply, and cause the infected cells to work abnormally or die. When these cells die, they release more of the virus. When this happens, you develop symptoms of the illness, and the virus continues to spread to other cells. If the virus takes over the function of the cell, it can cause the cell to divide and grow out of control. This happens when a virus causes cancer.    Different viruses get into the body in different ways. You can get a virus by:  •Swallowing food or water that has come in contact with the virus (is contaminated).      •Breathing in droplets that have been coughed or sneezed into the air by an infected person.      •Touching a surface that has been contaminated with the virus and then touching your eyes, nose, or mouth.      •Being bitten by an insect or animal that carries the virus.      •Having sexual contact with a person who is infected with the virus.      •Being exposed to blood or fluids that contain the virus, either through an open cut or during a transfusion.      If a virus enters your body, your body's defense system (immune system) will try to fight the virus. You may be at higher risk for a viral illness if your immune system is weak.      What are the signs or symptoms?    You may have these symptoms, depending on the type of virus and the location of the cells that it invades:•Cold and flu viruses:  •Fever.      •Headache.      •Sore throat.      •Muscle aches.      •Stuffy nose (nasal congestion).      •Cough.      •Digestive system (gastrointestinal) viruses:  •Fever.      •Pain in the abdomen.      •Nausea.      •Diarrhea.      •Liver viruses (hepatitis):  •Loss of appetite.      •Tiredness.      •Skin or the white parts of your eyes turning yellow (jaundice).      •Brain and spinal cord viruses:  •Fever.      •Headache.      •Stiff neck.      •Nausea and vomiting.      •Confusion or sleepiness.      •Skin viruses:  •Warts.      •Itching.      •Rash.      •Sexually transmitted viruses:  •Discharge.      •Swelling.      •Redness.      •Rash.          How is this diagnosed?    This condition may be diagnosed based on one or more of the following:  •Symptoms.      •Medical history.      •Physical exam.      •Blood test, sample of mucus from your lungs (sputum sample), stool sample, or a swab of body fluids or a skin sore (lesion).        How is this treated?    Viruses can be hard to treat because they live within cells. Antibiotic medicines do not treat viruses because these medicines do not get inside cells. Treatment for a viral illness may include:  •Resting and drinking plenty of fluids.      •Medicines to relieve symptoms. These can include over-the-counter medicine for pain and fever, medicines for cough or congestion, and medicines to relieve diarrhea.      •Antiviral medicines. These medicines are available only for certain types of viruses.      Some viral illnesses can be prevented with vaccinations. A common example is the flu shot.      Follow these instructions at home:    Medicines     •Take over-the-counter and prescription medicines only as told by your health care provider.      •If you were prescribed an antiviral medicine, take it as told by your health care provider. Do not stop taking the antiviral even if you start to feel better.    •Be aware of when antibiotics are needed and when they are not needed. Antibiotics do not treat viruses. You may get an antibiotic if your health care provider thinks that you may have, or are at risk for, a bacterial infection and you have a viral infection.  •Do not ask for an antibiotic prescription if you have been diagnosed with a viral illness. Antibiotics will not make your illness go away faster.      •Frequently taking antibiotics when they are not needed can lead to antibiotic resistance. When this develops, the medicine no longer works against the bacteria that it normally fights.          General instructions      •Drink enough fluids to keep your urine pale yellow.      •Rest as much as possible.      •Return to your normal activities as told by your health care provider. Ask your health care provider what activities are safe for you.      •Keep all follow-up visits as told by your health care provider. This is important.        How is this prevented?     To reduce your risk of viral illness:  •Wash your hands often with soap and water for at least 20 seconds. If soap and water are not available, use hand .      •Avoid touching your nose, eyes, and mouth, especially if you have not washed your hands recently.      •If anyone in your household has a viral infection, clean all household surfaces that may have been in contact with the virus. Use soap and hot water. You may also use bleach that you have added water to (diluted).      •Stay away from people who are sick with symptoms of a viral infection.      •Do not share items such as toothbrushes and water bottles with other people.      •Keep your vaccinations up to date. This includes getting a yearly flu shot.      •Eat a healthy diet and get plenty of rest.        Contact a health care provider if:    •You have symptoms of a viral illness that do not go away.      •Your symptoms come back after going away.      •Your symptoms get worse.        Get help right away if you have:    •Trouble breathing.      •A severe headache or a stiff neck.      •Severe vomiting or pain in your abdomen.      These symptoms may represent a serious problem that is an emergency. Do not wait to see if the symptoms will go away. Get medical help right away. Call your local emergency services (911 in the [place] Do not drive yourself to the hospital.       Summary    •Viruses are types of germs that can get into a person's body and cause illness. Viral illnesses can range from mild to severe. They can affect various parts of the body.      •Viruses can be hard to treat. There are medicines to relieve symptoms, and there are some antiviral medicines.      •If you were prescribed an antiviral medicine, take it as told by your health care provider. Do not stop taking the antiviral even if you start to feel better.      •Contact a health care provider if you have symptoms of a viral illness that do not go away.      This information is not intended to replace advice given to you by your health care provider. Make sure you discuss any questions you have with your health care provider.

## 2022-10-31 NOTE — ED STATDOCS - PATIENT PORTAL LINK FT
You can access the FollowMyHealth Patient Portal offered by [facility] by registering at the following website: [email or web address]. By joining [facility]’s FollowMyHealth portal, you will also be able to view your health information using other applications (apps) compatible with our system.

## 2022-10-31 NOTE — ED STATDOCS - PHYSICAL EXAMINATION
General: AAOx3, NAD  HEENT: NCAT  Cardiac: Normal rate and rhythm, no murmurs, normal peripheral perfusion  Respiratory: Normal rate and effort. CTAB  GI: Soft, nondistended, Minimal epigastric and RUQ tenderness, negative Alvarenga's  Neuro: No focal deficits. JUAREZ equally x4, sensation to light touch intact throughout  MSK: FROMx4, no focal bony tenderness, no peripheral edema  Skin: No rash

## 2022-10-31 NOTE — ED STATDOCS - PROGRESS NOTE DETAILS
BILLY Isidro (PGY-3) - labs not actionable. Pt improved w/ zofran. Will dc w/ zofran and supportive measures.

## 2022-12-12 NOTE — ED STATDOCS - INTERNATIONAL TRAVEL
December 12, 2022       Isaiah Gaviria MD  [address] [place] [address]  Via In Basket      Patient: Golden Oviedo   YOB: 1970   Date of Visit: 12/12/2022       Dear Isaiah Gaviria MD:    I wanted to try and provide you with an update on Golden Oviedo. Below are my notes for my visit with him on December 12, 2022.    If you have questions, please do not hesitate to call me.       Sincerely,        Joe Escalona DO        CC: No Recipients  Joe Escalona DO  12/12/2022 12:51 PM  Signed  [facility] ambulatory encounter  ENDOCRINOLOGY OFFICE VISIT    CHIEF COMPLAINT: Chronic hypothyroidism with now worsening fatigue, weight gain and concerns for other hormone abnormalities as well as possible need to switch to armor thyroid.     PRIMARY CARE PROVIDER:  Catrachito Membreno MD   REFERRING PROVIDER: Isaiah Gaviria MD    SUBJECTIVE:  Golden Oviedo is a 52 year old male who presented requesting evaluation for primary hypothyroidism. He notes weight gain, poor energy, prediabetes. He was evaluated by ENT where [place] thyroid was thought to may help patient's symptoms, given thyroid atrophy. Has noted 20 lb weight gain, which is concerning for him. Fatigue is present. Not sitting, very active, constantly on the go. Working lots of hours 60+. Feels more tired, even took a nap on stairs due to severity. Goes to sleep at 9pm, wakes up at 4 pm. During 2 days off felt OK. Earlier in the year was on 325 mcg now to 300 mcg daily. Weight is now 275 lb.     Breakfast: simple sausage biscuit/cheese mcmuffin, small amount of soda. 10 am, leftovers. Hungry when he gets home, does not snack, but if so: usually cashews, nuts, candy bar once a day. Hardly drinks sodas in general. Water mostly, green tea, propel, milk 8oz every few days. No history of weight loss medications. Prescribed metformin due to trying to life-style changes.     No tobacco use  Rare alcohol use, not nightly, rarely weekly    TRISTEN  No SCREEN  Do you snore loudly: yes  Do you often feel tired, fatigued, sleepy during the daytime: yes  Has anyone observed you stop breathing during sleep: none   Do you have high blood pressure: no   BMI: greater than 35? yes  Age: greater than 50? yes   Neck circumference: >40cm: 50 cm   Gender: Male? yes  (3+ is high risk)    Also has A1C of 5.7%.      Review of systems:    All other systems are reviewed and are negative except as documented in the history of present illness.     OBJECTIVE:  PAST HISTORIES:  I have reviewed the past medical history, family history, social history, medications and allergies listed in the medical record as obtained by my nursing staff and support staff and agree with their documentation.  ALLERGIES:  No Known Allergies  Current Outpatient Medications   Medication Sig Dispense Refill   • levothyroxine 300 MCG tablet TAKE 1 TABLET BY MOUTH DAILY 90 tablet 1   • pantoprazole (PROTONIX) 40 MG tablet TAKE 1 TABLET BY MOUTH DAILY 90 tablet 3   • metFORMIN (GLUCOPHAGE) 500 MG tablet TAKE 1 TABLET BY MOUTH DAILY WITH BREAKFAST 90 tablet 0   • fluocinonide (LIDEX) 0.05 % cream Apply to the the hands BID PRN 30 g 3   • ketoconazole (NIZORAL) 2 % shampoo Shampoo the area above the eyebrows, around the nose and the beard Monday- Wednesday and Friday 120 mL 1   • triamcinolone (ARISTOCORT) 0.1 % ointment applied to the hands twice daily x 3 weeks, then take 1 week break, then repeat as needed. 30 g 1   • MAGNESIUM OXIDE PO      • MULTIPLE MINERALS PO Take  by mouth.     • cholecalciferol (VITAMIN D3) 1000 UNITS tablet Take 1,000 Units by mouth daily.       No current facility-administered medications for this visit.     Past Medical History:   Diagnosis Date   • Gastroesophageal reflux disease    • Hypothyroid    • Kidney stones    • Obesity, Class II, BMI 35-39.9 8/26/2014     Past Surgical History:   Procedure Laterality Date   • Colonoscopy diagnostic  07/11/2008   • Egd  07/14/2020     Esophagogastroduodenoscopy with biopsies and Savary dilation-Dr Coy   • Esophagogastroduodenoscopy  2011    Dr Davis   • Esophagogastroduodenoscopy  3/19/15   • Extracorporeal shock wave lithotripsy     • Hand exploration Left 2004     Social History     Tobacco Use   • Smoking status: Never Smoker   • Smokeless tobacco: Never Used   Substance Use Topics   • Alcohol use: Yes     Alcohol/week: 2.0 standard drinks     Types: 2 Standard drinks or equivalent per week   • Drug use: No     Family History   Problem Relation Age of Onset   • Cancer Mother         throat resolved   • Diabetes Father    • High blood pressure Father    • High cholesterol Father    • Stroke Father    • Heart disease Father    • Glaucoma Father    • Hypertension Father        Physical Exam:    VITAL SIGNS:    Visit Vitals  /78 (BP Location: LUE - Left upper extremity, Patient Position: Sitting, Cuff Size: Regular)   Pulse 66   Wt 128.4 kg (283 lb)   BMI 38.38 kg/m²     Body mass index is 38.38 kg/m².    WEIGHTS:   Wt Readings from Last 4 Encounters:   12/12/22 128.4 kg (283 lb)   05/16/22 122.4 kg (269 lb 14.4 oz)   04/26/22 122.6 kg (270 lb 4.8 oz)   05/13/21 120.2 kg (265 lb)     GENERAL:  Oriented x3. Appears well developed and well nourished. No acute distress.   SKIN: Warm, dry and intact. No rashes, bruises, lesions or acanthosis nigricans.  EYES: Conjunctivae normal, anicteric. No lid lag. Negative exophthalmos.   NECK: No masses or lymphadenopathy. No thyromegaly.  Trachea is midline. 50 cm neck circumference  LUNGS: Non-labored respirations. No cough or wheeze  CARDIOVASCULAR: S1, S2, regular rate and rhythm, no murmurs.   ABDOMEN: Soft and nontender. No masses, hepatomegaly or splenomegaly.  EXTREMITIES: Normal range of motion x4. No edema or tenderness.  NEUROLOGIC: Normal reflexes. No tremors or motor deficits. Stable gait.  PSYCHIATRIC: Normal mood and affect.    LAB RESULTS:  Component       TSH T4, FREE   Latest Ref Rng &  Units       0.350 - 5.000 mcUnits/mL 0.8 - 1.5 ng/dL   10/9/2020       <0.008 (L) 1.6 (H)   3/9/2021      10:47 AM <0.008 (L) 1.2   3/9/2021      10:47 AM  1.2   5/10/2022       <0.008 (L) 1.8 (H)   8/21/2022       <0.008 (L) 1.7 (H)     Component       TRIIODOTHYRONINE, FREE   Latest Ref Rng & Units       2.2 - 4.0 pg/mL   10/9/2020          3/9/2021      10:47 AM 2.8   3/9/2021      10:47 AM 2.8   5/10/2022       4.8 (H)   8/21/2022       4.1 (H)     Component GLYCOHEMOGLOBIN A1C   Latest Ref Rng & Units 4.5 - 5.6 %   5/10/2022 5.7 (H)   8/16/2022 5.7 (H)     Component      Latest Ref Rng & Units 5/13/2021   AST/SGOT      <=37 Units/L 25   ALT/SGPT      <64 Units/L 53       Assessment:   1. Hypothyroidism, unspecified type    2. Male hypogonadism    3. Somnolence    4. Vitamin D deficiency    5. Obesity, Class II, BMI 35-39.9       52 year old male with history of mild TRISTEN, A1C of 5.7%, low testosterone 10+ years ago, presenting for evaluation of hypothyroidism. Symptoms include fatigue, somnolence, and weight gain. Risks include caloric intake, hyperthyroidism, untreated TRISTEN, and hypogonadism.     He is high risk for TRISTEN based on screening. This is likely increasing his body weight and causing impaired fasting glucose / pre-diabetes.     He is concerned about his thyroid function. TSH has been suppressed for at least 1 year. This can paradoxically contribute to fatigue. T4 and T3 remain elevated.  No major signs of malabsorption or gluten intolerance.     PLAN:  - Given overt hyperthyroidism from Levothyroxine, will update TSH, T4, and T3. Based on results would aim for mid reference range TSH level. He is interested in name brand levothyroxine (ie Synthroid or Levoxyl - and will replace to brand once dosing needs are confirmed with lab result). I would defer [place] for now as he needs euthyroidism prior to introducing agent with T3 that can increase chance of a-fib. Estimated Levothyroxine needs are around 200 -  250 mcg daily  - He has screened positive for likely TRISTEN. Suspicion is high, along with high-normal Hg and HCT in past. This may be contributing to further weight gain and somnolence. Discussed implications/risks of untreated TRISTEN  - Recheck total/free testosterone given hypogonad history 10 years ago. Likely due to obesity and untreated TRISTEN, but will re-evaluate. Discussed testosterone replacement is best used when on CPAP for TRISTEN  - Check vitamin D given vitamin d deficiency risk  - Defer cortisol workup as no overt signs of cushing's   - In future consider dietetics evaluation if high BMI persists      Followup - TBD based on labs    Thank you for allowing me to participate in the care of this patient.    Joe Escalona,      I spent a total of 60 minutes on the day of the visit.  This includes pre-charting, chart review and documenting.

## 2022-12-21 ENCOUNTER — EMERGENCY (EMERGENCY)
Facility: HOSPITAL | Age: 37
LOS: 0 days | Discharge: LEFT BEFORE DISCHARGE | End: 2022-12-21

## 2022-12-21 VITALS — HEIGHT: 61 IN | WEIGHT: 197.98 LBS
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
## 2022-12-21 DIAGNOSIS — Z53.21 PROCEDURE AND TREATMENT NOT CARRIED OUT DUE TO PATIENT LEAVING PRIOR TO BEING SEEN BY HEALTH CARE PROVIDER: ICD-10-CM

## 2022-12-21 DIAGNOSIS — S61.412A LACERATION WITHOUT FOREIGN BODY OF LEFT HAND, INITIAL ENCOUNTER: ICD-10-CM
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
## 2022-12-21 PROCEDURE — L9992: CPT

## 2022-12-21 NOTE — ED ADULT TRIAGE NOTE - CHIEF COMPLAINT QUOTE
pt c/o laceration to left palm appx 1-2cm. pt was using a steak knife to cut a coconut and knife slipped. unknown last tetanus shot.

## 2023-02-28 NOTE — ED STATDOCS - DATE/TIME 1
31-Oct-2022 14:26 Island Pedicle Flap-Requiring Vessel Identification Text: The defect edges were debeveled with a #15 scalpel blade.  Given the location of the defect, shape of the defect and the proximity to free margins an island pedicle advancement flap was deemed most appropriate.  Using a sterile surgical marker, an appropriate advancement flap was drawn, based on the axial vessel mentioned above, incorporating the defect, outlining the appropriate donor tissue and placing the expected incisions within the relaxed skin tension lines where possible.    The area thus outlined was incised deep to adipose tissue with a #15 scalpel blade.  The skin margins were undermined to an appropriate distance in all directions around the primary defect and laterally outward around the island pedicle utilizing iris scissors.  There was minimal undermining beneath the pedicle flap.

## 2024-10-15 ENCOUNTER — ASOB RESULT (OUTPATIENT)
Age: 39
End: 2024-10-15

## 2024-10-15 ENCOUNTER — APPOINTMENT (OUTPATIENT)
Dept: ANTEPARTUM | Facility: CLINIC | Age: 39
End: 2024-10-15
Payer: MEDICAID

## 2024-10-15 PROCEDURE — 76813 OB US NUCHAL MEAS 1 GEST: CPT

## 2024-12-10 ENCOUNTER — ASOB RESULT (OUTPATIENT)
Age: 39
End: 2024-12-10

## 2024-12-10 ENCOUNTER — APPOINTMENT (OUTPATIENT)
Dept: ANTEPARTUM | Facility: CLINIC | Age: 39
End: 2024-12-10
Payer: MEDICAID

## 2024-12-10 PROCEDURE — 76811 OB US DETAILED SNGL FETUS: CPT

## 2024-12-10 PROCEDURE — 76817 TRANSVAGINAL US OBSTETRIC: CPT

## 2025-02-28 ENCOUNTER — OUTPATIENT (OUTPATIENT)
Dept: INPATIENT UNIT | Facility: HOSPITAL | Age: 40
LOS: 1 days | Discharge: HOME | End: 2025-02-28
Payer: MEDICAID

## 2025-02-28 VITALS — HEIGHT: 61 IN | WEIGHT: 201.94 LBS

## 2025-02-28 VITALS
HEART RATE: 110 BPM | OXYGEN SATURATION: 97 % | TEMPERATURE: 98 F | RESPIRATION RATE: 22 BRPM | SYSTOLIC BLOOD PRESSURE: 133 MMHG | DIASTOLIC BLOOD PRESSURE: 82 MMHG

## 2025-02-28 DIAGNOSIS — Z98.891 HISTORY OF UTERINE SCAR FROM PREVIOUS SURGERY: Chronic | ICD-10-CM

## 2025-02-28 DIAGNOSIS — O26.899 OTHER SPECIFIED PREGNANCY RELATED CONDITIONS, UNSPECIFIED TRIMESTER: ICD-10-CM

## 2025-02-28 DIAGNOSIS — Z90.721 ACQUIRED ABSENCE OF OVARIES, UNILATERAL: Chronic | ICD-10-CM

## 2025-02-28 LAB
ALBUMIN SERPL ELPH-MCNC: 2.7 G/DL — LOW (ref 3.3–5)
ALP SERPL-CCNC: 80 U/L — SIGNIFICANT CHANGE UP (ref 40–120)
ALT FLD-CCNC: 16 U/L — SIGNIFICANT CHANGE UP (ref 12–78)
ANION GAP SERPL CALC-SCNC: 10 MMOL/L — SIGNIFICANT CHANGE UP (ref 5–17)
APPEARANCE UR: CLEAR — SIGNIFICANT CHANGE UP
AST SERPL-CCNC: 11 U/L — LOW (ref 15–37)
BILIRUB SERPL-MCNC: 0.2 MG/DL — SIGNIFICANT CHANGE UP (ref 0.2–1.2)
BILIRUB UR-MCNC: NEGATIVE — SIGNIFICANT CHANGE UP
BUN SERPL-MCNC: 10 MG/DL — SIGNIFICANT CHANGE UP (ref 7–23)
CALCIUM SERPL-MCNC: 9.2 MG/DL — SIGNIFICANT CHANGE UP (ref 8.5–10.1)
CHLORIDE SERPL-SCNC: 108 MMOL/L — SIGNIFICANT CHANGE UP (ref 96–108)
CO2 SERPL-SCNC: 20 MMOL/L — LOW (ref 22–31)
COLOR SPEC: YELLOW — SIGNIFICANT CHANGE UP
CREAT SERPL-MCNC: 0.72 MG/DL — SIGNIFICANT CHANGE UP (ref 0.5–1.3)
DIFF PNL FLD: NEGATIVE — SIGNIFICANT CHANGE UP
EGFR: 109 ML/MIN/1.73M2 — SIGNIFICANT CHANGE UP
GLUCOSE SERPL-MCNC: 93 MG/DL — SIGNIFICANT CHANGE UP (ref 70–99)
GLUCOSE UR QL: NEGATIVE MG/DL — SIGNIFICANT CHANGE UP
HCT VFR BLD CALC: 31.5 % — LOW (ref 34.5–45)
HGB BLD-MCNC: 10.9 G/DL — LOW (ref 11.5–15.5)
KETONES UR-MCNC: NEGATIVE MG/DL — SIGNIFICANT CHANGE UP
LEUKOCYTE ESTERASE UR-ACNC: ABNORMAL
MCHC RBC-ENTMCNC: 29.6 PG — SIGNIFICANT CHANGE UP (ref 27–34)
MCHC RBC-ENTMCNC: 34.6 G/DL — SIGNIFICANT CHANGE UP (ref 32–36)
MCV RBC AUTO: 85.6 FL — SIGNIFICANT CHANGE UP (ref 80–100)
NITRITE UR-MCNC: NEGATIVE — SIGNIFICANT CHANGE UP
NRBC # BLD AUTO: 0 K/UL — SIGNIFICANT CHANGE UP (ref 0–0)
NRBC # FLD: 0 K/UL — SIGNIFICANT CHANGE UP (ref 0–0)
NRBC BLD AUTO-RTO: 0 /100 WBCS — SIGNIFICANT CHANGE UP (ref 0–0)
PH UR: 6.5 — SIGNIFICANT CHANGE UP (ref 5–8)
PLATELET # BLD AUTO: 317 K/UL — SIGNIFICANT CHANGE UP (ref 150–400)
PMV BLD: 11.2 FL — SIGNIFICANT CHANGE UP (ref 7–13)
POTASSIUM SERPL-MCNC: 3.4 MMOL/L — LOW (ref 3.5–5.3)
POTASSIUM SERPL-SCNC: 3.4 MMOL/L — LOW (ref 3.5–5.3)
PROT SERPL-MCNC: 7.6 GM/DL — SIGNIFICANT CHANGE UP (ref 6–8.3)
PROT UR-MCNC: NEGATIVE MG/DL — SIGNIFICANT CHANGE UP
RBC # BLD: 3.68 M/UL — LOW (ref 3.8–5.2)
RBC # FLD: 12.5 % — SIGNIFICANT CHANGE UP (ref 10.3–14.5)
SODIUM SERPL-SCNC: 138 MMOL/L — SIGNIFICANT CHANGE UP (ref 135–145)
SP GR SPEC: 1.01 — SIGNIFICANT CHANGE UP (ref 1–1.03)
UROBILINOGEN FLD QL: 0.2 MG/DL — SIGNIFICANT CHANGE UP (ref 0.2–1)
WBC # BLD: 15.7 K/UL — HIGH (ref 3.8–10.5)
WBC # FLD AUTO: 15.7 K/UL — HIGH (ref 3.8–10.5)

## 2025-02-28 PROCEDURE — 85027 COMPLETE CBC AUTOMATED: CPT

## 2025-02-28 PROCEDURE — 96374 THER/PROPH/DIAG INJ IV PUSH: CPT

## 2025-02-28 PROCEDURE — 99222 1ST HOSP IP/OBS MODERATE 55: CPT | Mod: 25,GC

## 2025-02-28 PROCEDURE — 59025 FETAL NON-STRESS TEST: CPT

## 2025-02-28 PROCEDURE — 81001 URINALYSIS AUTO W/SCOPE: CPT

## 2025-02-28 PROCEDURE — 96375 TX/PRO/DX INJ NEW DRUG ADDON: CPT

## 2025-02-28 PROCEDURE — 80053 COMPREHEN METABOLIC PANEL: CPT

## 2025-02-28 PROCEDURE — 36415 COLL VENOUS BLD VENIPUNCTURE: CPT

## 2025-02-28 PROCEDURE — 96361 HYDRATE IV INFUSION ADD-ON: CPT

## 2025-02-28 PROCEDURE — 59025 FETAL NON-STRESS TEST: CPT | Mod: 26

## 2025-02-28 PROCEDURE — 99214 OFFICE O/P EST MOD 30 MIN: CPT

## 2025-02-28 RX ORDER — ACETAMINOPHEN 500 MG/5ML
1000 LIQUID (ML) ORAL ONCE
Refills: 0 | Status: COMPLETED | OUTPATIENT
Start: 2025-02-28 | End: 2025-02-28

## 2025-02-28 RX ORDER — SODIUM CHLORIDE 9 G/1000ML
1000 INJECTION, SOLUTION INTRAVENOUS ONCE
Refills: 0 | Status: COMPLETED | OUTPATIENT
Start: 2025-02-28 | End: 2025-02-28

## 2025-02-28 RX ORDER — ONDANSETRON HCL/PF 4 MG/2 ML
4 VIAL (ML) INJECTION ONCE
Refills: 0 | Status: COMPLETED | OUTPATIENT
Start: 2025-02-28 | End: 2025-02-28

## 2025-02-28 RX ADMIN — Medication 4 MILLIGRAM(S): at 22:00

## 2025-02-28 RX ADMIN — Medication 400 MILLIGRAM(S): at 22:00

## 2025-02-28 RX ADMIN — SODIUM CHLORIDE 1000 MILLILITER(S): 9 INJECTION, SOLUTION INTRAVENOUS at 22:00

## 2025-02-28 RX ADMIN — Medication 1000 MILLIGRAM(S): at 23:21

## 2025-02-28 NOTE — OB PROVIDER TRIAGE NOTE - ATTENDING COMMENTS
38 yo  at 31w2d GA who presents to L&D triage for abdominal pain, nausea.     -FHT: reactive, reassuring  -[place]: not doron  -Cervix 0/0/-3,  labor unlikely  - Improved after IV fluids LR, IV zofran, IV tylenol. No guarding or rebound. patient feels significantly better. Discussed with the patient the possibility of infectious etiology. She has leukocytosis which could represent a GI bug vs appendicitis. The fact that she improved with IVF and has no guarding is reassuring. Patient opted for rest at home rather than continued evaluation. She agrees to return for further evaluation if has recurrence or worsening of symptoms. Given strict precautions to call her OB for fever, worsening nausea vomitting, or intolerance of po.       Boo Paul DO

## 2025-02-28 NOTE — OB RN TRIAGE NOTE - CHIEF COMPLAINT QUOTE
" I started have abdominal pain around 7pm, which started as a constant pain but now progress to come and go sharp/ dull like pain."

## 2025-02-28 NOTE — OB RN TRIAGE NOTE - NS_ATTENDNOTIFIED_OBGYN_ALL_OB
OFFICE VISIT      Patient: Myesha Sal Date of Service: 2022   : 1979 MRN: [id number]     SUBJECTIVE:     Chief Complaint   Patient presents with   • Office Visit   • Annual Exam       HISTORY OF PRESENT ILLNESS:  Myesha Sal is a 43 year old female who presents today for annual physical and labs. Denies any other concerns today.           PAST MEDICAL HISTORY:  Past Medical History:   Diagnosis Date   • No known problems        MEDICATIONS:  Current Outpatient Medications   Medication Sig   • escitalopram (LEXAPRO) 10 MG tablet    • norethindrone (MICRONOR) 0.35 MG tablet Take 0.35 mg by mouth.   • Aurovela 24 FE 1-20 MG-MCG(24) tablet TAKE 1 TABLET BY MOUTH DAILY     No current facility-administered medications for this visit.       ALLERGIES:  ALLERGIES:  No Active Allergies    PAST SURGICAL HISTORY:  History reviewed. No pertinent surgical history.    FAMILY HISTORY:  Family History   Problem Relation Age of Onset   • Diabetes Father        SOCIAL HISTORY:  Social History     Tobacco Use   Smoking Status Never Smoker   Smokeless Tobacco Never Used     Social History     Substance and Sexual Activity   Alcohol Use Yes       Review of Systems   Constitutional: Negative for fatigue.   HENT: Negative for dental problem, ear pain, sore throat and trouble swallowing.    Eyes: Negative for pain and visual disturbance.   Respiratory: Negative for cough and shortness of breath.    Cardiovascular: Negative for chest pain and palpitations.   Gastrointestinal: Negative for abdominal pain, constipation and diarrhea.   Endocrine: Negative for polydipsia, polyphagia and polyuria.   Genitourinary: Negative for dysuria, frequency and hematuria.   Musculoskeletal: Negative for arthralgias.   Skin: Negative for rash.   Neurological: Negative for dizziness, numbness and headaches.   Hematological: Does not bruise/bleed easily.   Psychiatric/Behavioral: Negative for behavioral problems.         OBJECTIVE:     Visit  Vitals  /82 (BP Location: LUE - Left upper extremity, Patient Position: Sitting, Cuff Size: Regular)   Pulse 65   Temp 98.3 °F (36.8 °C) (Temporal)   Resp 20   Ht 5' 5\" (1.651 m)   Wt 61.3 kg (135 lb 1.6 oz)   LMP 08/20/2022   SpO2 99%   BMI 22.48 kg/m²       Physical Exam  Vitals and nursing note reviewed.   Constitutional:       General: She is not in acute distress.     Appearance: Normal appearance. She is well-developed.   HENT:      Head: Normocephalic and atraumatic.      Right Ear: Tympanic membrane and ear canal normal.      Left Ear: Tympanic membrane and ear canal normal.      Nose: Nose normal. No mucosal edema.      Mouth/Throat:      Mouth: Mucous membranes are moist.      Pharynx: No oropharyngeal exudate or posterior oropharyngeal erythema.      Neck: Normal range of motion and neck supple.   Eyes:      Extraocular Movements: Extraocular movements intact.      Conjunctiva/sclera: Conjunctivae normal.      Pupils: Pupils are equal, round, and reactive to light.   Neck:      Thyroid: No thyromegaly.   Cardiovascular:      Rate and Rhythm: Normal rate and regular rhythm.      Heart sounds: Normal heart sounds. No murmur heard.  Pulmonary:      Effort: Pulmonary effort is normal.      Breath sounds: Normal breath sounds. No wheezing.   Abdominal:      General: Bowel sounds are normal.      Palpations: Abdomen is soft.      Tenderness: There is no abdominal tenderness.   Musculoskeletal:         General: No deformity. Normal range of motion.   Skin:     General: Skin is warm and dry.      Findings: No rash.   Neurological:      Mental Status: She is alert and oriented to person, place, and time.   Psychiatric:         Mood and Affect: Mood normal.           DIAGNOSTIC STUDIES:   LAB RESULTS:    No visits with results within 1 Month(s) from this visit.   Latest known visit with results is:   Office Visit on 01/21/2021   Component Date Value Ref Range Status   • TSH 01/21/2021 1.168  0.350 - 5.000  mcUnits/mL Final   • Cholesterol 01/21/2021 164  <=199 mg/dL Final   • Triglycerides 01/21/2021 82  <=149 mg/dL Final   • HDL 01/21/2021 85  >=50 mg/dL Final   • LDL 01/21/2021 63  <=129 mg/dL Final   • Non-HDL Cholesterol 01/21/2021 79  mg/dL Final   • Cholesterol/ HDL Ratio 01/21/2021 1.9  <=4.4 Final   • Sodium 01/21/2021 139  135 - 145 mmol/L Final   • Potassium 01/21/2021 4.3  3.4 - 5.1 mmol/L Final   • Chloride 01/21/2021 107  98 - 107 mmol/L Final   • Carbon Dioxide 01/21/2021 25  21 - 32 mmol/L Final   • Anion Gap 01/21/2021 11  10 - 20 mmol/L Final   • Glucose 01/21/2021 78  65 - 99 mg/dL Final   • BUN 01/21/2021 14  6 - 20 mg/dL Final   • Creatinine 01/21/2021 0.83  0.51 - 0.95 mg/dL Final   • Glomerular Filtration Rate 01/21/2021 88 (A) >90 mL/min/1.73m2 Final   • BUN/ Creatinine Ratio 01/21/2021 17  7 - 25 Final   • Calcium 01/21/2021 9.4  8.4 - 10.2 mg/dL Final   • Bilirubin, Total 01/21/2021 0.6  0.2 - 1.0 mg/dL Final   • GOT/AST 01/21/2021 15  <=37 Units/L Final   • GPT/ALT 01/21/2021 17  <64 Units/L Final   • Alkaline Phosphatase 01/21/2021 37 (A) 45 - 117 Units/L Final   • Albumin 01/21/2021 4.1  3.6 - 5.1 g/dL Final   • Protein, Total 01/21/2021 7.6  6.4 - 8.2 g/dL Final   • Globulin 01/21/2021 3.5  2.0 - 4.0 g/dL Final   • A/G Ratio 01/21/2021 1.2  1.0 - 2.4 Final   • WBC 01/21/2021 5.6  4.2 - 11.0 K/mcL Final   • RBC 01/21/2021 4.45  4.00 - 5.20 mil/mcL Final   • HGB 01/21/2021 13.3  12.0 - 15.5 g/dL Final   • HCT 01/21/2021 41.1  36.0 - 46.5 % Final   • MCV 01/21/2021 92.4  78.0 - 100.0 fl Final   • MCH 01/21/2021 29.9  26.0 - 34.0 pg Final   • MCHC 01/21/2021 32.4  32.0 - 36.5 g/dL Final   • RDW-CV 01/21/2021 12.8  11.0 - 15.0 % Final   • RDW-SD 01/21/2021 43.5  39.0 - 50.0 fL Final   • PLT 01/21/2021 259  140 - 450 K/mcL Final   • NRBC 01/21/2021 0  <=0 /100 WBC Final   • Neutrophil, Percent 01/21/2021 59  % Final   • Lymphocytes, Percent 01/21/2021 29  % Final   • Mono, Percent 01/21/2021 8   % Final   • Eosinophils, Percent 01/21/2021 3  % Final   • Basophils, Percent 01/21/2021 1  % Final   • Immature Granulocytes 01/21/2021 0  % Final   • Absolute Neutrophils 01/21/2021 3.3  1.8 - 7.7 K/mcL Final   • Absolute Lymphocytes 01/21/2021 1.6  1.0 - 4.8 K/mcL Final   • Absolute Monocytes 01/21/2021 0.4  0.3 - 0.9 K/mcL Final   • Absolute Eosinophils  01/21/2021 0.2  0.0 - 0.5 K/mcL Final   • Absolute Basophils 01/21/2021 0.1  0.0 - 0.3 K/mcL Final   • Absolute Immmature Granulocytes 01/21/2021 0.0  0.0 - 0.2 K/mcL Final             ASSESSMENT AND PLAN:   This is a 43 year old year-old female who presents with     1. Annual physical exam    2. Need for Tdap vaccination        Problem List Items Addressed This Visit        Health Encounters    Annual physical exam - Primary     Educated on healthy diet and exercise   labs ordered         Relevant Orders    CBC WITH DIFFERENTIAL    COMPREHENSIVE METABOLIC PANEL    LIPID PANEL WITHOUT REFLEX    THYROID STIMULATING HORMONE REFLEX      Other Visit Diagnoses     Need for Tdap vaccination        Relevant Orders    TETANUS DIPHTHERIA ACELLULAR PERTUSSIS VACC, 10+ YRS (BOOSTRIX) (Completed)          Return in about 1 year (around 8/24/2023).        Instructions provided as documented in the AVS.  Medication use,effects and side effects discussed in detail with patient.  The patient indicated understanding of the diagnosis and agreed with the plan of care.    Patient expresses understanding of the plan.  Proper usage and side effects of medications reviewed & discussed.  Refer to orders.  Return to clinic as clinically indicated as discussed with patient who verbalized understanding of & agreement with the plan.    Entered by Alden Pantoja MD       Yes

## 2025-02-28 NOTE — OB PROVIDER TRIAGE NOTE - NSHPPHYSICALEXAM_GEN_ALL_CORE
T(C): 36.7 (02-28-25 @ 20:53), Max: 36.7 (02-28-25 @ 20:53)  HR: 110 (02-28-25 @ 20:53) (110 - 110)  BP: 133/82 (02-28-25 @ 20:53) (133/82 - 133/82)  RR: 22 (02-28-25 @ 20:53) (22 - 22)  SpO2: 97% (02-28-25 @ 20:53) (97% - 97%)    Gen: NAD, well-appearing  Heart: S1 S2, RRR  Lungs: CTAB  Abd: soft, gravid  Ext: non-edematous, non-tender   SVE: 0/0/-3    FHT: 145bpm +accels, no decels mod ayla  [place]: not doron

## 2025-02-28 NOTE — OB PROVIDER TRIAGE NOTE - HISTORY OF PRESENT ILLNESS
NITO ARAGON is a 39y  at 31w2d GA who presents to L&D for abdominal pain and nausea x a few hours. She reports she ate chinese food at 2PM, followed by generalized abdominal pain and nausea that began at 7pm. She reports nausea but no vomiting. She had a loose stool but not diarrhea. She reports the abdominal pain felt lower, but is now in her epigastric region. She denies constipation, she is passing gas. No sick contacts. She denies dysuria or hematuria.   She denies contractions, leakage of fluid, or vaginal bleeding. She reports good fetal movement.     Pt denies trauma.   Pt denies headaches, visual disturbances, RUQ pain, epigastric pain and new-onset edema.   She denies any urinary complaints.   She denies fevers, chills, nausea, vomiting.   She denies shortness of breath, chest pain, and palpitations.    Pregnancy course is significant for:  -Prior c/s x3    POB: denies  PGYN: -fibroids/-cysts, denied STD hx, denies abnormal PAPs  PMH: denies  PSH: cholecystectomy, c/s x3  SH: Denies tobacco use, EtOH use and illicit drug use during the pregnancy; Feels safe at home  Meds: pnv  All: nkda

## 2025-02-28 NOTE — OB RN TRIAGE NOTE - GRAVIDA, OB PROFILE
"Awake and tolerating po well. States she has \"pressure, but she expected that\" refuses offer of narcotic, will take tylenol at home after 1645.   "
4

## 2025-02-28 NOTE — OB RN TRIAGE NOTE - NSICDXPASTSURGICALHX_GEN_ALL_CORE_FT
PAST SURGICAL HISTORY:  H/O  section     S/P cholecystectomy      PAST SURGICAL HISTORY:  H/O  section     History of right salpingo-oophorectomy     S/P cholecystectomy

## 2025-02-28 NOTE — OB PROVIDER TRIAGE NOTE - NSOBPROVIDERNOTE_OBGYN_ALL_OB_FT
38 yo  at 31w2d GA who presents to L&D triage for abdominal pain, nausea. Rule-out  labor vs. GI source. -FHT: reactive, reassuring  -[place]: not doron  -Cervix 0/0/-3,  labor unlikely  -Given history of recent chinese food consumption and generalized abdominal pain, nausea, and loose stools, likely food poisoning vs. GI virus.   -IV fluids LR, IV zofran, IV tylenol  -Recommended IV pepcid for epigastric pain, patient declined  -CBC, CMP, U/A ordered, will f/u    Dispo: pending labs, meds, IVF. Will re-eval in 1 hour    D/w Dr. Paul

## 2025-03-03 DIAGNOSIS — R11.0 NAUSEA: ICD-10-CM

## 2025-03-03 DIAGNOSIS — R10.13 EPIGASTRIC PAIN: ICD-10-CM

## 2025-03-03 DIAGNOSIS — D72.829 ELEVATED WHITE BLOOD CELL COUNT, UNSPECIFIED: ICD-10-CM

## 2025-03-03 DIAGNOSIS — O99.113 OTHER DISEASES OF THE BLOOD AND BLOOD-FORMING ORGANS AND CERTAIN DISORDERS INVOLVING THE IMMUNE MECHANISM COMPLICATING PREGNANCY, THIRD TRIMESTER: ICD-10-CM

## 2025-03-03 DIAGNOSIS — O34.219 MATERNAL CARE FOR UNSPECIFIED TYPE SCAR FROM PREVIOUS CESAREAN DELIVERY: ICD-10-CM

## 2025-03-03 DIAGNOSIS — Z3A.31 31 WEEKS GESTATION OF PREGNANCY: ICD-10-CM

## 2025-03-03 DIAGNOSIS — O09.523 SUPERVISION OF ELDERLY MULTIGRAVIDA, THIRD TRIMESTER: ICD-10-CM

## 2025-03-03 DIAGNOSIS — O26.893 OTHER SPECIFIED PREGNANCY RELATED CONDITIONS, THIRD TRIMESTER: ICD-10-CM

## 2025-03-03 DIAGNOSIS — R10.30 LOWER ABDOMINAL PAIN, UNSPECIFIED: ICD-10-CM

## 2025-06-19 NOTE — ED ADULT NURSE NOTE - CINV DISCH MEDS REVIEWED YN
[facility] Attending Follow-up Note     Patient seen and examined at bedside. 6/19/2025    Overnight Events:     events noted     REVIEW OF SYSTEMS:  Constitutional:     [x ] negative [ ] fevers [ ] chills [ ] weight loss [ ] weight gain  HEENT:                  [x ] negative [ ] dry eyes [ ] eye irritation [ ] postnasal drip [ ] nasal congestion  CV:                         [ x] negative  [ ] chest pain [ ] orthopnea [ ] palpitations [ ] murmur  Resp:                     [ x] negative [ ] cough [ ] shortness of breath [ ] dyspnea [ ] wheezing [ ] sputum [ ]hemoptysis  GI:                          [ x] negative [ ] nausea [ ] vomiting [ ] diarrhea [ ] constipation [ ] abd pain [ ] dysphagia   :                        [ x] negative [ ] dysuria [ ] nocturia [ ] hematuria [ ] increased urinary frequency  Musculoskeletal: [ x] negative [ ] back pain [ ] myalgias [ ] arthralgias [ ] fracture  Skin:                       [ x] negative [ ] rash [ ] itch  Neurological:        [x ] negative [ ] headache [ ] dizziness [ ] syncope [ ] weakness [ ] numbness  Psychiatric:           [ x] negative [ ] anxiety [ ] depression  Endocrine:            [ x] negative [ ] diabetes [ ] thyroid problem  Heme/Lymph:      [ x] negative [ ] anemia [ ] bleeding problem  Allergic/Immune: [ x] negative [ ] itchy eyes [ ] nasal discharge [ ] hives [ ] angioedema    [ x] All other systems negative  [ ] Unable to assess ROS due to    Current Meds:      PAST MEDICAL & SURGICAL HISTORY:  Hypertension      CAD (coronary artery disease)          Vitals:  T(F): --  HR: --  BP: --  RR: --  SpO2: --  I&O's Summary    20 Jun 2025 07:01  -  21 Jun 2025 07:00  --------------------------------------------------------  IN: 490 mL / OUT: 250 mL / NET: 240 mL        Physical Exam:  Appearance: No acute distress  HENT: No JVD   Cardiovascular: RRR, S1/S2, no murmurs  Respiratory: CTABL  Gastrointestinal: soft, NT ND, +BS  Musculoskeletal: No clubbing, no edema   Neurologic: Non-focal  Skin: No rashes, ecchymoses, or cyanosis                          7.8    9.54  )-----------( 436      ( 20 Jun 2025 05:46 )             26.7     06-20    137  |  102  |  6[L]  ----------------------------<  117[H]  4.0   |  27  |  0.79    Ca    8.7      20 Jun 2025 05:46  Phos  2.6     06-20  Mg     2.10     06-20                    Cardiovascular Testings:      Yes